# Patient Record
Sex: FEMALE | Race: BLACK OR AFRICAN AMERICAN | NOT HISPANIC OR LATINO | Employment: OTHER | URBAN - METROPOLITAN AREA
[De-identification: names, ages, dates, MRNs, and addresses within clinical notes are randomized per-mention and may not be internally consistent; named-entity substitution may affect disease eponyms.]

---

## 2017-01-01 ENCOUNTER — OFFICE VISIT (OUTPATIENT)
Dept: HEMATOLOGY/ONCOLOGY | Facility: CLINIC | Age: 77
End: 2017-01-01
Payer: COMMERCIAL

## 2017-01-01 ENCOUNTER — TELEPHONE (OUTPATIENT)
Dept: UROLOGY | Facility: CLINIC | Age: 77
End: 2017-01-01

## 2017-01-01 ENCOUNTER — HOSPITAL ENCOUNTER (INPATIENT)
Facility: HOSPITAL | Age: 77
LOS: 2 days | DRG: 871 | End: 2017-12-29
Attending: EMERGENCY MEDICINE | Admitting: HOSPITALIST
Payer: COMMERCIAL

## 2017-01-01 ENCOUNTER — OFFICE VISIT (OUTPATIENT)
Dept: PULMONOLOGY | Facility: CLINIC | Age: 77
End: 2017-01-01
Payer: COMMERCIAL

## 2017-01-01 ENCOUNTER — HOSPITAL ENCOUNTER (OUTPATIENT)
Dept: RADIOLOGY | Facility: HOSPITAL | Age: 77
Discharge: HOME | End: 2017-05-09
Attending: PHYSICAL MEDICINE & REHABILITATION
Payer: COMMERCIAL

## 2017-01-01 VITALS
BODY MASS INDEX: 23.14 KG/M2 | OXYGEN SATURATION: 98 % | TEMPERATURE: 98 F | RESPIRATION RATE: 19 BRPM | HEIGHT: 58 IN | HEART RATE: 118 BPM | SYSTOLIC BLOOD PRESSURE: 90 MMHG | DIASTOLIC BLOOD PRESSURE: 64 MMHG | WEIGHT: 110.25 LBS

## 2017-01-01 VITALS
DIASTOLIC BLOOD PRESSURE: 90 MMHG | SYSTOLIC BLOOD PRESSURE: 148 MMHG | DIASTOLIC BLOOD PRESSURE: 97 MMHG | HEIGHT: 58 IN | HEIGHT: 59 IN | RESPIRATION RATE: 18 BRPM | HEART RATE: 111 BPM | BODY MASS INDEX: 22.88 KG/M2 | WEIGHT: 110.81 LBS | HEART RATE: 111 BPM | BODY MASS INDEX: 22.34 KG/M2 | TEMPERATURE: 98 F | WEIGHT: 109 LBS | SYSTOLIC BLOOD PRESSURE: 140 MMHG

## 2017-01-01 DIAGNOSIS — J96.21 ACUTE ON CHRONIC RESPIRATORY FAILURE WITH HYPOXIA AND HYPERCAPNIA: ICD-10-CM

## 2017-01-01 DIAGNOSIS — J43.9 PULMONARY EMPHYSEMA, UNSPECIFIED EMPHYSEMA TYPE: Primary | Chronic | ICD-10-CM

## 2017-01-01 DIAGNOSIS — J96.22 ACUTE ON CHRONIC RESPIRATORY FAILURE WITH HYPOXIA AND HYPERCAPNIA: ICD-10-CM

## 2017-01-01 DIAGNOSIS — I27.20 PULMONARY HYPERTENSION: ICD-10-CM

## 2017-01-01 DIAGNOSIS — D75.1 POLYCYTHEMIA SECONDARY TO HYPOXIA: ICD-10-CM

## 2017-01-01 DIAGNOSIS — I26.99 PULMONARY EMBOLISM ON RIGHT: Primary | Chronic | ICD-10-CM

## 2017-01-01 DIAGNOSIS — N28.89 RIGHT RENAL MASS: Chronic | ICD-10-CM

## 2017-01-01 DIAGNOSIS — J96.21 ACUTE ON CHRONIC RESPIRATORY FAILURE WITH HYPOXIA: Primary | ICD-10-CM

## 2017-01-01 DIAGNOSIS — R09.02 HYPOXEMIA: ICD-10-CM

## 2017-01-01 DIAGNOSIS — I26.99 PULMONARY EMBOLISM ON RIGHT: Chronic | ICD-10-CM

## 2017-01-01 DIAGNOSIS — J44.1 COPD WITH ACUTE EXACERBATION: ICD-10-CM

## 2017-01-01 DIAGNOSIS — D69.6 THROMBOCYTOPENIA: Chronic | ICD-10-CM

## 2017-01-01 DIAGNOSIS — R09.02 HYPOXIA: ICD-10-CM

## 2017-01-01 DIAGNOSIS — R06.02 SHORTNESS OF BREATH: ICD-10-CM

## 2017-01-01 LAB
ALBUMIN SERPL BCP-MCNC: 2.7 G/DL
ALP SERPL-CCNC: 64 U/L
ALT SERPL W/O P-5'-P-CCNC: 11 U/L
ANION GAP SERPL CALC-SCNC: 15 MMOL/L
AST SERPL-CCNC: 24 U/L
BACTERIA #/AREA URNS HPF: NORMAL /HPF
BACTERIA UR CULT: NORMAL
BASOPHILS NFR BLD: 0 %
BILIRUB SERPL-MCNC: 0.6 MG/DL
BILIRUB UR QL STRIP: NEGATIVE
BNP SERPL-MCNC: 1708 PG/ML
BUN SERPL-MCNC: 12 MG/DL
CALCIUM SERPL-MCNC: 8.3 MG/DL
CHLORIDE SERPL-SCNC: 105 MMOL/L
CLARITY UR: CLEAR
CO2 SERPL-SCNC: 21 MMOL/L
COLOR UR: YELLOW
CREAT SERPL-MCNC: 0.8 MG/DL
CRP SERPL-MCNC: 25.2 MG/L
DIFFERENTIAL METHOD: ABNORMAL
EOSINOPHIL NFR BLD: 0 %
ERYTHROCYTE [DISTWIDTH] IN BLOOD BY AUTOMATED COUNT: 15 %
EST. GFR  (AFRICAN AMERICAN): >60 ML/MIN/1.73 M^2
EST. GFR  (NON AFRICAN AMERICAN): >60 ML/MIN/1.73 M^2
ESTIMATED AVG GLUCOSE: 111 MG/DL
GLUCOSE SERPL-MCNC: 213 MG/DL
GLUCOSE UR QL STRIP: NEGATIVE
HBA1C MFR BLD HPLC: 5.5 %
HCT VFR BLD AUTO: 44.9 %
HGB BLD-MCNC: 14.4 G/DL
HGB UR QL STRIP: NEGATIVE
HYALINE CASTS #/AREA URNS LPF: 0 /LPF
KETONES UR QL STRIP: NEGATIVE
LACTATE SERPL-SCNC: 2.7 MMOL/L
LACTATE SERPL-SCNC: 4.8 MMOL/L
LACTATE SERPL-SCNC: 5.1 MMOL/L
LACTATE SERPL-SCNC: 6.5 MMOL/L
LEUKOCYTE ESTERASE UR QL STRIP: NEGATIVE
LYMPHOCYTES NFR BLD: 4 %
MAGNESIUM SERPL-MCNC: 1.6 MG/DL
MCH RBC QN AUTO: 33.9 PG
MCHC RBC AUTO-ENTMCNC: 32 G/DL
MCV RBC AUTO: 106 FL
MICROSCOPIC COMMENT: NORMAL
MONOCYTES NFR BLD: 0 %
NEUTROPHILS NFR BLD: 95 %
NEUTS BAND NFR BLD MANUAL: 1 %
NITRITE UR QL STRIP: NEGATIVE
PH UR STRIP: 6 [PH] (ref 5–8)
PHOSPHATE SERPL-MCNC: 3.6 MG/DL
PLATELET # BLD AUTO: 119 K/UL
PLATELET BLD QL SMEAR: ABNORMAL
PMV BLD AUTO: 9.9 FL
POTASSIUM SERPL-SCNC: 4 MMOL/L
PROCALCITONIN SERPL IA-MCNC: 0.14 NG/ML
PROT SERPL-MCNC: 7.3 G/DL
PROT UR QL STRIP: ABNORMAL
RBC # BLD AUTO: 4.24 M/UL
RBC #/AREA URNS HPF: 0 /HPF (ref 0–4)
SODIUM SERPL-SCNC: 141 MMOL/L
SP GR UR STRIP: 1.01 (ref 1–1.03)
SQUAMOUS #/AREA URNS HPF: 3 /HPF
TROPONIN I SERPL DL<=0.01 NG/ML-MCNC: 0.03 NG/ML
URN SPEC COLLECT METH UR: ABNORMAL
UROBILINOGEN UR STRIP-ACNC: ABNORMAL EU/DL
WBC # BLD AUTO: 4.4 K/UL
WBC #/AREA URNS HPF: 0 /HPF (ref 0–5)

## 2017-01-01 PROCEDURE — 25000003 PHARM REV CODE 250

## 2017-01-01 PROCEDURE — 84484 ASSAY OF TROPONIN QUANT: CPT | Mod: 91

## 2017-01-01 PROCEDURE — 83605 ASSAY OF LACTIC ACID: CPT

## 2017-01-01 PROCEDURE — 85027 COMPLETE CBC AUTOMATED: CPT

## 2017-01-01 PROCEDURE — 99214 OFFICE O/P EST MOD 30 MIN: CPT | Mod: ,,, | Performed by: INTERNAL MEDICINE

## 2017-01-01 PROCEDURE — 63600175 PHARM REV CODE 636 W HCPCS: Performed by: NURSE PRACTITIONER

## 2017-01-01 PROCEDURE — 87086 URINE CULTURE/COLONY COUNT: CPT

## 2017-01-01 PROCEDURE — 27000190 HC CPAP FULL FACE MASK W/VALVE

## 2017-01-01 PROCEDURE — 84100 ASSAY OF PHOSPHORUS: CPT

## 2017-01-01 PROCEDURE — 25000242 PHARM REV CODE 250 ALT 637 W/ HCPCS: Performed by: EMERGENCY MEDICINE

## 2017-01-01 PROCEDURE — 99285 EMERGENCY DEPT VISIT HI MDM: CPT | Mod: 25

## 2017-01-01 PROCEDURE — 3077F SYST BP >= 140 MM HG: CPT | Mod: ,,, | Performed by: INTERNAL MEDICINE

## 2017-01-01 PROCEDURE — 1126F AMNT PAIN NOTED NONE PRSNT: CPT | Mod: ,,, | Performed by: INTERNAL MEDICINE

## 2017-01-01 PROCEDURE — 80053 COMPREHEN METABOLIC PANEL: CPT

## 2017-01-01 PROCEDURE — 25000242 PHARM REV CODE 250 ALT 637 W/ HCPCS: Performed by: HOSPITALIST

## 2017-01-01 PROCEDURE — 1159F MED LIST DOCD IN RCRD: CPT | Mod: ,,, | Performed by: INTERNAL MEDICINE

## 2017-01-01 PROCEDURE — 3008F BODY MASS INDEX DOCD: CPT | Mod: ,,, | Performed by: INTERNAL MEDICINE

## 2017-01-01 PROCEDURE — 83735 ASSAY OF MAGNESIUM: CPT

## 2017-01-01 PROCEDURE — 25000003 PHARM REV CODE 250: Performed by: HOSPITALIST

## 2017-01-01 PROCEDURE — 3080F DIAST BP >= 90 MM HG: CPT | Mod: ,,, | Performed by: INTERNAL MEDICINE

## 2017-01-01 PROCEDURE — 63600175 PHARM REV CODE 636 W HCPCS: Performed by: HOSPITALIST

## 2017-01-01 PROCEDURE — 12000002 HC ACUTE/MED SURGE SEMI-PRIVATE ROOM

## 2017-01-01 PROCEDURE — 81000 URINALYSIS NONAUTO W/SCOPE: CPT

## 2017-01-01 PROCEDURE — 99900035 HC TECH TIME PER 15 MIN (STAT)

## 2017-01-01 PROCEDURE — 83605 ASSAY OF LACTIC ACID: CPT | Mod: 91

## 2017-01-01 PROCEDURE — 83880 ASSAY OF NATRIURETIC PEPTIDE: CPT

## 2017-01-01 PROCEDURE — 93005 ELECTROCARDIOGRAM TRACING: CPT

## 2017-01-01 PROCEDURE — 71275 CT ANGIOGRAPHY CHEST: CPT | Mod: 26,,, | Performed by: RADIOLOGY

## 2017-01-01 PROCEDURE — 94660 CPAP INITIATION&MGMT: CPT

## 2017-01-01 PROCEDURE — 25000003 PHARM REV CODE 250: Performed by: NURSE PRACTITIONER

## 2017-01-01 PROCEDURE — 96365 THER/PROPH/DIAG IV INF INIT: CPT

## 2017-01-01 PROCEDURE — 83036 HEMOGLOBIN GLYCOSYLATED A1C: CPT

## 2017-01-01 PROCEDURE — 36415 COLL VENOUS BLD VENIPUNCTURE: CPT

## 2017-01-01 PROCEDURE — 25000003 PHARM REV CODE 250: Performed by: EMERGENCY MEDICINE

## 2017-01-01 PROCEDURE — 85007 BL SMEAR W/DIFF WBC COUNT: CPT

## 2017-01-01 PROCEDURE — 25500020 PHARM REV CODE 255

## 2017-01-01 PROCEDURE — 86140 C-REACTIVE PROTEIN: CPT

## 2017-01-01 PROCEDURE — 94640 AIRWAY INHALATION TREATMENT: CPT

## 2017-01-01 PROCEDURE — 84145 PROCALCITONIN (PCT): CPT

## 2017-01-01 PROCEDURE — 99223 1ST HOSP IP/OBS HIGH 75: CPT | Mod: ,,, | Performed by: INTERNAL MEDICINE

## 2017-01-01 PROCEDURE — 94761 N-INVAS EAR/PLS OXIMETRY MLT: CPT

## 2017-01-01 PROCEDURE — 20000000 HC ICU ROOM

## 2017-01-01 PROCEDURE — 63600175 PHARM REV CODE 636 W HCPCS: Performed by: EMERGENCY MEDICINE

## 2017-01-01 PROCEDURE — 71275 CT ANGIOGRAPHY CHEST: CPT | Mod: TC

## 2017-01-01 RX ORDER — POTASSIUM CHLORIDE 20 MEQ/15ML
40 SOLUTION ORAL
Status: DISCONTINUED | OUTPATIENT
Start: 2017-01-01 | End: 2017-01-01 | Stop reason: HOSPADM

## 2017-01-01 RX ORDER — SUCRALFATE 1 G/10ML
1 SUSPENSION ORAL
Status: DISCONTINUED | OUTPATIENT
Start: 2017-01-01 | End: 2017-01-01 | Stop reason: HOSPADM

## 2017-01-01 RX ORDER — BUSPIRONE HYDROCHLORIDE 5 MG/1
5 TABLET ORAL 2 TIMES DAILY
Status: DISCONTINUED | OUTPATIENT
Start: 2017-01-01 | End: 2017-01-01 | Stop reason: HOSPADM

## 2017-01-01 RX ORDER — CYANOCOBALAMIN (VITAMIN B-12) 250 MCG
500 TABLET ORAL DAILY
Status: DISCONTINUED | OUTPATIENT
Start: 2017-01-01 | End: 2017-01-01 | Stop reason: HOSPADM

## 2017-01-01 RX ORDER — SIMETHICONE 80 MG
TABLET,CHEWABLE ORAL
Status: COMPLETED
Start: 2017-01-01 | End: 2017-01-01

## 2017-01-01 RX ORDER — FUROSEMIDE 20 MG/1
TABLET ORAL
Qty: 30 TABLET | Refills: 6 | Status: SHIPPED | OUTPATIENT
Start: 2017-01-01 | End: 2017-01-01 | Stop reason: SDUPTHER

## 2017-01-01 RX ORDER — POTASSIUM CHLORIDE 20 MEQ/1
40 TABLET, EXTENDED RELEASE ORAL ONCE
Status: COMPLETED | OUTPATIENT
Start: 2017-01-01 | End: 2017-01-01

## 2017-01-01 RX ORDER — GLUCAGON 1 MG
1 KIT INJECTION
Status: DISCONTINUED | OUTPATIENT
Start: 2017-01-01 | End: 2017-01-01 | Stop reason: HOSPADM

## 2017-01-01 RX ORDER — ALBUTEROL SULFATE 2.5 MG/.5ML
10 SOLUTION RESPIRATORY (INHALATION)
Status: COMPLETED | OUTPATIENT
Start: 2017-01-01 | End: 2017-01-01

## 2017-01-01 RX ORDER — IPRATROPIUM BROMIDE AND ALBUTEROL SULFATE 2.5; .5 MG/3ML; MG/3ML
3 SOLUTION RESPIRATORY (INHALATION) EVERY 4 HOURS
Status: DISCONTINUED | OUTPATIENT
Start: 2017-01-01 | End: 2017-01-01 | Stop reason: HOSPADM

## 2017-01-01 RX ORDER — SODIUM CHLORIDE 0.9 % (FLUSH) 0.9 %
5 SYRINGE (ML) INJECTION
Status: DISCONTINUED | OUTPATIENT
Start: 2017-01-01 | End: 2017-01-01 | Stop reason: HOSPADM

## 2017-01-01 RX ORDER — IBUPROFEN 200 MG
24 TABLET ORAL
Status: DISCONTINUED | OUTPATIENT
Start: 2017-01-01 | End: 2017-01-01 | Stop reason: HOSPADM

## 2017-01-01 RX ORDER — LANOLIN ALCOHOL/MO/W.PET/CERES
800 CREAM (GRAM) TOPICAL
Status: DISCONTINUED | OUTPATIENT
Start: 2017-01-01 | End: 2017-01-01 | Stop reason: HOSPADM

## 2017-01-01 RX ORDER — SIMETHICONE 80 MG
1 TABLET,CHEWABLE ORAL 3 TIMES DAILY PRN
Status: DISCONTINUED | OUTPATIENT
Start: 2017-01-01 | End: 2017-01-01 | Stop reason: HOSPADM

## 2017-01-01 RX ORDER — ASPIRIN 81 MG/1
81 TABLET ORAL DAILY
Status: DISCONTINUED | OUTPATIENT
Start: 2017-01-01 | End: 2017-01-01 | Stop reason: HOSPADM

## 2017-01-01 RX ORDER — AMOXICILLIN 250 MG
1 CAPSULE ORAL 2 TIMES DAILY
Status: DISCONTINUED | OUTPATIENT
Start: 2017-01-01 | End: 2017-01-01 | Stop reason: HOSPADM

## 2017-01-01 RX ORDER — IBUPROFEN 200 MG
16 TABLET ORAL
Status: DISCONTINUED | OUTPATIENT
Start: 2017-01-01 | End: 2017-01-01 | Stop reason: HOSPADM

## 2017-01-01 RX ORDER — POTASSIUM CHLORIDE 20 MEQ/15ML
60 SOLUTION ORAL
Status: DISCONTINUED | OUTPATIENT
Start: 2017-01-01 | End: 2017-01-01 | Stop reason: HOSPADM

## 2017-01-01 RX ORDER — ONDANSETRON 2 MG/ML
8 INJECTION INTRAMUSCULAR; INTRAVENOUS EVERY 8 HOURS PRN
Status: DISCONTINUED | OUTPATIENT
Start: 2017-01-01 | End: 2017-01-01 | Stop reason: HOSPADM

## 2017-01-01 RX ORDER — SODIUM CHLORIDE 9 MG/ML
INJECTION, SOLUTION INTRAVENOUS ONCE
Status: COMPLETED | OUTPATIENT
Start: 2017-01-01 | End: 2017-01-01

## 2017-01-01 RX ORDER — AMLODIPINE BESYLATE 5 MG/1
5 TABLET ORAL DAILY
Status: DISCONTINUED | OUTPATIENT
Start: 2017-01-01 | End: 2017-01-01

## 2017-01-01 RX ORDER — RAMELTEON 8 MG/1
8 TABLET ORAL NIGHTLY PRN
Status: DISCONTINUED | OUTPATIENT
Start: 2017-01-01 | End: 2017-01-01 | Stop reason: SDUPTHER

## 2017-01-01 RX ORDER — MEGESTROL ACETATE 40 MG/1
40 TABLET ORAL DAILY
Status: DISCONTINUED | OUTPATIENT
Start: 2017-01-01 | End: 2017-01-01 | Stop reason: HOSPADM

## 2017-01-01 RX ORDER — ACETAMINOPHEN 500 MG
1000 TABLET ORAL EVERY 6 HOURS PRN
Status: DISCONTINUED | OUTPATIENT
Start: 2017-01-01 | End: 2017-01-01 | Stop reason: HOSPADM

## 2017-01-01 RX ORDER — METHYLPREDNISOLONE SOD SUCC 125 MG
80 VIAL (EA) INJECTION EVERY 8 HOURS
Status: DISCONTINUED | OUTPATIENT
Start: 2017-01-01 | End: 2017-01-01 | Stop reason: HOSPADM

## 2017-01-01 RX ORDER — ENOXAPARIN SODIUM 100 MG/ML
40 INJECTION SUBCUTANEOUS EVERY 24 HOURS
Status: DISCONTINUED | OUTPATIENT
Start: 2017-01-01 | End: 2017-01-01 | Stop reason: HOSPADM

## 2017-01-01 RX ORDER — IPRATROPIUM BROMIDE AND ALBUTEROL SULFATE 2.5; .5 MG/3ML; MG/3ML
3 SOLUTION RESPIRATORY (INHALATION) EVERY 4 HOURS
Status: DISCONTINUED | OUTPATIENT
Start: 2017-01-01 | End: 2017-01-01

## 2017-01-01 RX ORDER — RAMELTEON 8 MG/1
8 TABLET ORAL NIGHTLY PRN
Status: DISCONTINUED | OUTPATIENT
Start: 2017-01-01 | End: 2017-01-01 | Stop reason: HOSPADM

## 2017-01-01 RX ORDER — SODIUM CHLORIDE 9 MG/ML
INJECTION, SOLUTION INTRAVENOUS CONTINUOUS
Status: DISCONTINUED | OUTPATIENT
Start: 2017-01-01 | End: 2017-01-01 | Stop reason: HOSPADM

## 2017-01-01 RX ADMIN — SUCRALFATE 1 G: 1 SUSPENSION ORAL at 10:12

## 2017-01-01 RX ADMIN — BUSPIRONE HYDROCHLORIDE 5 MG: 5 TABLET ORAL at 08:12

## 2017-01-01 RX ADMIN — BUSPIRONE HYDROCHLORIDE 5 MG: 5 TABLET ORAL at 10:12

## 2017-01-01 RX ADMIN — MEGESTROL ACETATE 40 MG: 40 TABLET ORAL at 08:12

## 2017-01-01 RX ADMIN — SODIUM CHLORIDE 1500 ML: 0.9 INJECTION, SOLUTION INTRAVENOUS at 01:12

## 2017-01-01 RX ADMIN — METHYLPREDNISOLONE SODIUM SUCCINATE 80 MG: 125 INJECTION, POWDER, FOR SOLUTION INTRAMUSCULAR; INTRAVENOUS at 05:12

## 2017-01-01 RX ADMIN — SIMETHICONE CHEW TAB 80 MG 80 MG: 80 TABLET ORAL at 10:12

## 2017-01-01 RX ADMIN — Medication 80 MG: at 10:12

## 2017-01-01 RX ADMIN — IPRATROPIUM BROMIDE AND ALBUTEROL SULFATE 3 ML: .5; 3 SOLUTION RESPIRATORY (INHALATION) at 03:12

## 2017-01-01 RX ADMIN — SODIUM CHLORIDE 500 ML: 0.9 INJECTION, SOLUTION INTRAVENOUS at 11:12

## 2017-01-01 RX ADMIN — IPRATROPIUM BROMIDE AND ALBUTEROL SULFATE 3 ML: .5; 3 SOLUTION RESPIRATORY (INHALATION) at 11:12

## 2017-01-01 RX ADMIN — MAGNESIUM OXIDE TAB 400 MG (241.3 MG ELEMENTAL MG) 800 MG: 400 (241.3 MG) TAB at 10:12

## 2017-01-01 RX ADMIN — METHYLPREDNISOLONE SODIUM SUCCINATE 80 MG: 125 INJECTION, POWDER, FOR SOLUTION INTRAMUSCULAR; INTRAVENOUS at 02:12

## 2017-01-01 RX ADMIN — AZITHROMYCIN MONOHYDRATE 500 MG: 500 INJECTION, POWDER, LYOPHILIZED, FOR SOLUTION INTRAVENOUS at 11:12

## 2017-01-01 RX ADMIN — MAGNESIUM OXIDE TAB 400 MG (241.3 MG ELEMENTAL MG) 800 MG: 400 (241.3 MG) TAB at 03:12

## 2017-01-01 RX ADMIN — CYANOCOBALAMIN TAB 250 MCG 500 MCG: 250 TAB at 08:12

## 2017-01-01 RX ADMIN — SODIUM CHLORIDE: 0.9 INJECTION, SOLUTION INTRAVENOUS at 08:12

## 2017-01-01 RX ADMIN — LIDOCAINE HYDROCHLORIDE: 20 SOLUTION ORAL; TOPICAL at 03:12

## 2017-01-01 RX ADMIN — PIPERACILLIN SODIUM AND TAZOBACTAM SODIUM 4.5 G: 4; .5 INJECTION, POWDER, LYOPHILIZED, FOR SOLUTION INTRAVENOUS at 02:12

## 2017-01-01 RX ADMIN — PIPERACILLIN SODIUM AND TAZOBACTAM SODIUM 4.5 G: 4; .5 INJECTION, POWDER, FOR SOLUTION INTRAVENOUS at 06:12

## 2017-01-01 RX ADMIN — STANDARDIZED SENNA CONCENTRATE AND DOCUSATE SODIUM 1 TABLET: 8.6; 5 TABLET, FILM COATED ORAL at 10:12

## 2017-01-01 RX ADMIN — VANCOMYCIN HYDROCHLORIDE 750 MG: 1 INJECTION, POWDER, LYOPHILIZED, FOR SOLUTION INTRAVENOUS at 02:12

## 2017-01-01 RX ADMIN — IPRATROPIUM BROMIDE AND ALBUTEROL SULFATE 3 ML: .5; 3 SOLUTION RESPIRATORY (INHALATION) at 04:12

## 2017-01-01 RX ADMIN — POTASSIUM CHLORIDE 40 MEQ: 1500 TABLET, EXTENDED RELEASE ORAL at 02:12

## 2017-01-01 RX ADMIN — ALBUTEROL SULFATE 10 MG: 2.5 SOLUTION RESPIRATORY (INHALATION) at 11:12

## 2017-01-01 RX ADMIN — SODIUM CHLORIDE: 0.9 INJECTION, SOLUTION INTRAVENOUS at 12:12

## 2017-01-01 RX ADMIN — PIPERACILLIN SODIUM AND TAZOBACTAM SODIUM 4.5 G: 4; .5 INJECTION, POWDER, FOR SOLUTION INTRAVENOUS at 10:12

## 2017-01-01 RX ADMIN — IOHEXOL 100 ML: 350 INJECTION, SOLUTION INTRAVENOUS at 10:05

## 2017-01-01 RX ADMIN — SUCRALFATE 1 G: 1 SUSPENSION ORAL at 05:12

## 2017-01-01 RX ADMIN — ASPIRIN 81 MG: 81 TABLET, COATED ORAL at 08:12

## 2017-01-01 RX ADMIN — ENOXAPARIN SODIUM 40 MG: 100 INJECTION SUBCUTANEOUS at 05:12

## 2017-01-01 RX ADMIN — SODIUM CHLORIDE: 0.9 INJECTION, SOLUTION INTRAVENOUS at 11:12

## 2017-01-01 RX ADMIN — STANDARDIZED SENNA CONCENTRATE AND DOCUSATE SODIUM 1 TABLET: 8.6; 5 TABLET, FILM COATED ORAL at 08:12

## 2017-01-01 RX ADMIN — METHYLPREDNISOLONE SODIUM SUCCINATE 80 MG: 125 INJECTION, POWDER, FOR SOLUTION INTRAMUSCULAR; INTRAVENOUS at 10:12

## 2017-01-01 RX ADMIN — ACETAMINOPHEN 1000 MG: 500 TABLET ORAL at 12:12

## 2017-01-01 RX ADMIN — IPRATROPIUM BROMIDE AND ALBUTEROL SULFATE 3 ML: .5; 3 SOLUTION RESPIRATORY (INHALATION) at 12:12

## 2017-01-01 RX ADMIN — IPRATROPIUM BROMIDE AND ALBUTEROL SULFATE 3 ML: .5; 3 SOLUTION RESPIRATORY (INHALATION) at 07:12

## 2017-01-01 RX ADMIN — ONDANSETRON 8 MG: 2 INJECTION INTRAMUSCULAR; INTRAVENOUS at 02:12

## 2017-05-15 PROBLEM — N28.89 RIGHT RENAL MASS: Chronic | Status: ACTIVE | Noted: 2017-01-01

## 2017-06-19 NOTE — TELEPHONE ENCOUNTER
Attempted to contact patient in regards to rescheduling appt. Insurance [employer] is not accepted

## 2017-08-08 PROBLEM — D75.1 POLYCYTHEMIA SECONDARY TO HYPOXIA: Status: ACTIVE | Noted: 2017-01-01

## 2017-08-08 NOTE — PROGRESS NOTES
[facility] Hematology/Oncology  PROGRESS NOTE      Subjective:       Patient ID:   NAME: Fatuma Velez : 1940     76 y.o. female    Referring Doc: Viridiana  Other Physicians: Bandar    Chief Complaint:  Polycythemia/chronic tcp    History of Present Illness:     Patient returns today for a regularly scheduled follow-up visit.  The patient is here with her daughter. She is on O2 portable. She denies any new issues. Her breathing has been stable. She denies any CP, HA's or N/V. No hematuria.             ROS:   GEN: normal without any fever, night sweats or weight loss  HEENT: normal with no HA's, sore throat, stiff neck, changes in vision  CV: normal with no CP, SOB, PND, THOMSON or orthopnea  PULM: normal with no SOB, cough, hemoptysis, sputum or pleuritic pain  GI: normal with no abdominal pain, nausea, vomiting, constipation, diarrhea, melanotic stools, BRBPR, or hematemesis  : normal with no hematuria, dysuria  BREAST: normal with no mass, discharge, pain  SKIN: normal with no rash, erythema, bruising, or swelling    Allergies:  Review of patient's allergies indicates:  No Known Allergies    Medications:    Current Outpatient Prescriptions:     albuterol (VENTOLIN HFA) 90 mcg/actuation inhaler, Inhale 2 puffs into the lungs every 6 (six) hours as needed for Wheezing. Rescue, Disp: 18 g, Rfl: 0    amlodipine (NORVASC) 5 MG tablet, Take 1 tablet (5 mg total) by mouth once daily., Disp: 90 tablet, Rfl: 0    aspirin (ECOTRIN) 81 MG EC tablet, Take 81 mg by mouth once daily., Disp: , Rfl:     busPIRone (BUSPAR) 5 MG Tab, Take 1 tablet (5 mg total) by mouth 2 (two) times daily as needed., Disp: 45 tablet, Rfl: 1    cyanocobalamin (VITAMIN B-12) 500 MCG tablet, Take 500 mcg by mouth once daily., Disp: , Rfl:     fluticasone-salmeterol 250-50 mcg/dose (ADVAIR DISKUS) 250-50 mcg/dose diskus inhaler, Inhale 1 puff into the lungs 2 (two) times daily., Disp: 60 each, Rfl: 1    furosemide (LASIX) 20 MG tablet, TAKE 1  "TABLET BY MOUTH EVERY DAY, Disp: 30 tablet, Rfl: 6    lisinopril 10 MG tablet, Take 1 tablet (10 mg total) by mouth once daily., Disp: 90 tablet, Rfl: 0    megestrol (MEGACE) 40 MG Tab, Take 1 tablet (40 mg total) by mouth once daily., Disp: 90 tablet, Rfl: 0    potassium chloride (MICRO-K) 10 MEQ CpSR, Take 1 capsule (10 mEq total) by mouth once daily., Disp: 90 capsule, Rfl: 0    VENTOLIN HFA 90 mcg/actuation inhaler, TAKE 2 PUFFS BY MOUTH EVERY 6 HOURS AS NEEDED, Disp: 18 g, Rfl: 0    PMHx/PSHx Updates:  See patient's last visit with me on 2/1/2017.  See H&P on 5/19/16        Pathology:  No matching staging information was found for the patient.          Objective:     Vitals:  Blood pressure (!) 148/97, pulse (!) 111, temperature 97.9 °F (36.6 °C), resp. rate 18, height 4' 11" (1.499 m), weight 50.3 kg (110 lb 12.8 oz).    Physical Examination:   GEN: no apparent distress, comfortable; AAOx3  HEAD: atraumatic and normocephalic  EYES: no pallor, no icterus, PERRLA  ENT: OMM, no pharyngeal erythema, external ears WNL; no nasal discharge; no thrush  NECK: no masses, thyroid normal, trachea midline, no LAD/LN's, supple  CV: RRR with no murmur; normal pulse; normal S1 and S2; mild pedal edema  CHEST: diffuse tight BS bilat; CTAB; normal breath sounds; no wheeze or crackles; on O2 portable  ABDOM: nontender and nondistended; soft; normal bowel sounds; no rebound/guarding  MUSC/Skeletal: ROM normal; no crepitus; joints normal; no deformities or arthropathy  EXTREM: no clubbing, cyanosis, inflammation or swelling  SKIN: no rashes, lesions, ulcers, petechiae or subcutaneous nodules  : no lopez  NEURO: grossly intact; motor/sensory WNL; AAOx3; no tremors  PSYCH: normal mood, affect and behavior  LYMPH: normal cervical, supraclavicular, axillary and groin LN's            Labs:     2/1/2017      Radiology/Diagnostic Studies:    CTA 5/9/2017    I have reviewed all available lab results and radiology " reports.    Assessment/Plan:   (1) 76 y.o. female with diagnosis of prior mild borderline polycythemia and chronic thrombocytopenia  - prior antiplatelet antibodies were negative  - prior JAK2 was negative  - suspect the polycythemia is due to the underlying COPD issues  - prior repeat platelet studies were wnl  - last CBC was from feb 2017    (2) Recent CTA with chronic right-sided pulmonary emboli in May 2017; she is not on any blood thinners    (3) COPD/empysema - followed by Dr Portillo with pulmonary  - former smoker - quit last year    (3) right kidney nodule - patient was referred to urology by Dr Kemp but she has yet to go to one    (4) HTN - followed by Dr Kemp    (5) poor historian        PLAN:  1. Recommend and encourage her to go see an urologist  2. Refer to   3. Check up to date CBC/plat  4. F/u with PCP and Pulm  RTC in 4 weeks  Fax note to  Henna Kemp MD, and Bandar    I spent over 25 mins with the patient, of which over half was face to face with the patient. Reviewing materials, labs, reports and studies. Making treatment and analytical decisions. Ordering necessary labs, tests and studies.      Discussion:     I have explained all of the above in detail and the patient understands all of the current recommendation(s). I have answered all of their questions to the best of my ability and to their complete satisfaction.   The patient is to continue with the current management plan.            Electronically signed by Adolfo Martin MD

## 2017-08-08 NOTE — LETTER
August 8, 2017      Henna Kemp MD  [address] [place] [address]           Critical access hospital Hematology Oncology  [address] [facility] [address] [facility]  [address]  [facility] [address]  Phone: [phone]  Fax: [phone]          Patient: Fatuma Velez   MR Number: [id number]   YOB: 1940   Date of Visit: 8/8/2017       Dear Dr. Henna Kemp:    Thank you for referring Fatuma Velez to me for evaluation. Attached you will find relevant portions of my assessment and plan of care.    If you have questions, please do not hesitate to call me. I look forward to following Fatuma Velez along with you.    Sincerely,    Adolfo Martin MD    Enclosure  CC:  No Recipients    If you would like to receive this communication electronically, please contact [email or web address] or [phone] to request more information on [address] Link access.    For providers and/or their staff who would like to refer a patient to [facility], please contact us through our one-stop-shop provider referral line, [facility], at [phone].    If you feel you have received this communication in error or would no longer like to receive these types of communications, please e-mail [email or web address]

## 2017-08-25 PROBLEM — R09.02 HYPOXEMIA: Status: ACTIVE | Noted: 2017-01-01

## 2017-08-25 NOTE — PROGRESS NOTES
Office Visit    HPI:    Here for follow up, doing well, no new respiratory complaints.  Would like to look into getting a portable concentrator for her oygen.  No ew issues, no recent hospitalizations.  Is seeing Dr Mendez to evaluate a renal mass noted on prior studies.    COPD    Pt is currently stable on present medications with no recent increases in their symptoms or use of rescue medications.  I have reviewed the medical regimen and re-educated the pt on the role of rescue and controlling medications.  All questions answered.  Inhaler technique seems adequate.    COPD Flowsheet    GOLD B    Last PFT - 11/2016  FEV1- 55 % DLCO - 8 %     + LABA/LAMA     + prn ALBUTEROL    mMRC -  0 - SOB with strenuous exercise   - + 1 - SOB level ground, slight hill   -  2 - SOB walk slower or stop for breath level ground   -  3 - SOB at 100 yards or after few minutes   -  4 - SOB in house, dressing    Referral to PULMONARY REHABILITATION -  NO    Tested for alpha-1-antitrypsin - NO  Result - na    Home Oxygen    Face to face visit with pt regarding their home oxygen.  We have discussed the need for oxygen including continuous use, prn use or night time use (as appropriate for the pt).  We discussed the need for compliance with the therapy. We will do recertifications as necessary.     Medications      Current Outpatient Prescriptions:     albuterol (VENTOLIN HFA) 90 mcg/actuation inhaler, Inhale 2 puffs into the lungs every 6 (six) hours as needed for Wheezing. Rescue, Disp: 18 g, Rfl: 0    amlodipine (NORVASC) 5 MG tablet, TAKE 1 TABLET(5 MG) BY MOUTH EVERY DAY, Disp: 90 tablet, Rfl: 0    aspirin (ECOTRIN) 81 MG EC tablet, Take 81 mg by mouth once daily., Disp: , Rfl:     busPIRone (BUSPAR) 5 MG Tab, Take 1 tablet (5 mg total) by mouth 2 (two) times daily as needed., Disp: 45 tablet, Rfl: 1    cyanocobalamin (VITAMIN B-12) 500 MCG tablet, Take 500 mcg by mouth once daily., Disp: , Rfl:     fluticasone-salmeterol  250-50 mcg/dose (ADVAIR DISKUS) 250-50 mcg/dose diskus inhaler, Inhale 1 puff into the lungs 2 (two) times daily., Disp: 60 each, Rfl: 1    furosemide (LASIX) 20 MG tablet, TAKE 1 TABLET BY MOUTH EVERY DAY, Disp: 30 tablet, Rfl: 6    lisinopril 10 MG tablet, TAKE 1 TABLET(10 MG) BY MOUTH EVERY DAY, Disp: 90 tablet, Rfl: 0    megestrol (MEGACE) 40 MG Tab, Take 1 tablet (40 mg total) by mouth once daily., Disp: 90 tablet, Rfl: 0    megestrol (MEGACE) 40 MG Tab, TAKE 1 TABLET(40 MG) BY MOUTH EVERY DAY, Disp: 90 tablet, Rfl: 0    potassium chloride (MICRO-K) 10 MEQ CpSR, TAKE 1 CAPSULE(10 MEQ) BY MOUTH EVERY DAY, Disp: 90 capsule, Rfl: 0    umeclidinium-vilanterol (ANORO ELLIPTA) 62.5-25 mcg/actuation DsDv, Inhale into the lungs. Controller, Disp: , Rfl:     VENTOLIN HFA 90 mcg/actuation inhaler, TAKE 2 PUFFS BY MOUTH EVERY 6 HOURS AS NEEDED, Disp: 18 g, Rfl: 0    Allergies    Review of patient's allergies indicates:   Allergen Reactions    Aspirin        Social History    History   Smoking Status    Former Smoker    Packs/day: 0.25    Years: 15.00    Types: Cigarettes    Quit date: 8/8/2016   Smokeless Tobacco    Never Used       ETOH - 0 drinks per week.    Drug Use - 0    Occupation - retired nurse assistant    Family History    Family History   Problem Relation Age of Onset    Hypertension Mother     Cancer Mother      breast cancer     Heart disease Mother     Cancer Father      lung ca    Hypertension Sister     Hypertension Brother     Diabetes Brother     Hyperlipidemia Brother     Heart disease Brother     Cancer Brother      gastric        ROS  Review of Systems   Constitutional: Negative for chills, diaphoresis, fever, malaise/fatigue and weight loss.   HENT: Negative for congestion.    Eyes: Negative for pain.   Respiratory: Negative for cough, hemoptysis, sputum production, shortness of breath, wheezing and stridor.    Cardiovascular: Negative for chest pain, palpitations, orthopnea,  "claudication, leg swelling and PND.   Gastrointestinal: Negative for abdominal pain, constipation, diarrhea, heartburn, nausea and vomiting.   Genitourinary: Negative for dysuria, frequency and urgency.   Musculoskeletal: Negative for falls and myalgias.   Neurological: Negative for sensory change, focal weakness and weakness.   Psychiatric/Behavioral: Negative for depression. The patient is not nervous/anxious.        Physical Exam    Vitals:    08/25/17 1012   BP: (!) 140/90   Pulse: (!) 111   Weight: 49.4 kg (109 lb)   Height: 4' 10" (1.473 m)       Physical Exam   Constitutional: She is oriented to person, place, and time. She appears well-developed and well-nourished. No distress.   HENT:   Head: Normocephalic and atraumatic.   Right Ear: External ear normal.   Left Ear: External ear normal.   Nose: Nose normal.   Mouth/Throat: Oropharynx is clear and moist.   Eyes: Conjunctivae and EOM are normal. Pupils are equal, round, and reactive to light.   Neck: Normal range of motion. Neck supple. No JVD present. No tracheal deviation present. No thyromegaly present.   Cardiovascular: Normal rate, regular rhythm, normal heart sounds and intact distal pulses.  Exam reveals no gallop and no friction rub.    No murmur heard.  Pulmonary/Chest: Effort normal and breath sounds normal. No stridor. No respiratory distress. She has no wheezes. She has no rales. She exhibits no tenderness.   Decreased BS throughout   Abdominal: Soft. Bowel sounds are normal. She exhibits no distension. There is no tenderness.   Musculoskeletal: Normal range of motion. She exhibits no edema or tenderness.   Lymphadenopathy:     She has no cervical adenopathy.   Neurological: She is alert and oriented to person, place, and time. No cranial nerve deficit.   Skin: Skin is warm and dry. She is not diaphoretic.   Psychiatric: She has a normal mood and affect. Her behavior is normal.   Nursing note and vitals reviewed.      Lab    @RESUFAST " (WBC,HGB,HCT,PLT)@    Sodium   Date Value Ref Range Status   05/09/2017 141 136 - 145 mmol/L Final   05/09/2017 141 136 - 145 mmol/L Final     Potassium   Date Value Ref Range Status   05/09/2017 3.4 (L) 3.5 - 5.1 mmol/L Final   05/09/2017 3.4 (L) 3.5 - 5.1 mmol/L Final     Chloride   Date Value Ref Range Status   05/09/2017 106 95 - 110 mmol/L Final   05/09/2017 106 95 - 110 mmol/L Final     CO2   Date Value Ref Range Status   05/09/2017 26 23 - 29 mmol/L Final   05/09/2017 26 23 - 29 mmol/L Final     Glucose   Date Value Ref Range Status   05/09/2017 91 70 - 110 mg/dL Final   05/09/2017 91 70 - 110 mg/dL Final     BUN, Bld   Date Value Ref Range Status   05/09/2017 11 8 - 23 mg/dL Final   05/09/2017 11 8 - 23 mg/dL Final     Creatinine   Date Value Ref Range Status   05/09/2017 0.8 0.5 - 1.4 mg/dL Final   05/09/2017 0.8 0.5 - 1.4 mg/dL Final     Calcium   Date Value Ref Range Status   05/09/2017 9.5 8.7 - 10.5 mg/dL Final   05/09/2017 9.5 8.7 - 10.5 mg/dL Final     Total Protein   Date Value Ref Range Status   05/09/2017 7.7 6.0 - 8.4 g/dL Final     Albumin   Date Value Ref Range Status   05/09/2017 3.5 3.5 - 5.2 g/dL Final     Total Bilirubin   Date Value Ref Range Status   05/09/2017 0.9 0.1 - 1.0 mg/dL Final     Comment:     For infants and newborns, interpretation of results should be based  on gestational age, weight and in agreement with clinical  observations.  Premature Infant recommended reference ranges:  Up to 24 hours.............<8.0 mg/dL  Up to 48 hours............<12.0 mg/dL  3-5 days..................<15.0 mg/dL  6-29 days.................<15.0 mg/dL       Alkaline Phosphatase   Date Value Ref Range Status   05/09/2017 56 55 - 135 U/L Final     AST   Date Value Ref Range Status   05/09/2017 37 10 - 40 U/L Final     ALT   Date Value Ref Range Status   05/09/2017 12 10 - 44 U/L Final     Anion Gap   Date Value Ref Range Status   05/09/2017 9 8 - 16 mmol/L Final   05/09/2017 9 8 - 16 mmol/L Final          Xray        Impression/Plan    Problem List Items Addressed This Visit        Pulmonary    Pulmonary emphysema - Primary (Chronic)  - continue present medications  - ordered portable concentrator  - RTC 6 month    Relevant Orders    OXYGEN FOR HOME USE    Hypoxemia  - continue with O2       Hematology    Pulmonary embolism on right (Chronic)  - aware      Other Visit Diagnoses    None.

## 2017-12-27 PROBLEM — A41.9 SEVERE SEPSIS: Status: ACTIVE | Noted: 2017-01-01

## 2017-12-27 PROBLEM — J18.9 COMMUNITY ACQUIRED PNEUMONIA OF RIGHT LOWER LOBE OF LUNG: Status: ACTIVE | Noted: 2017-01-01

## 2017-12-27 PROBLEM — J44.9 PULMONARY HYPERTENSION DUE TO COPD: Status: ACTIVE | Noted: 2017-01-01

## 2017-12-27 PROBLEM — R91.8 LUNG MASS: Status: ACTIVE | Noted: 2017-01-01

## 2017-12-27 PROBLEM — J96.22 ACUTE ON CHRONIC RESPIRATORY FAILURE WITH HYPOXIA AND HYPERCAPNIA: Status: ACTIVE | Noted: 2017-01-01

## 2017-12-27 PROBLEM — J96.21 ACUTE ON CHRONIC RESPIRATORY FAILURE WITH HYPOXIA AND HYPERCAPNIA: Status: ACTIVE | Noted: 2017-01-01

## 2017-12-27 PROBLEM — R65.20 SEVERE SEPSIS: Status: ACTIVE | Noted: 2017-01-01

## 2017-12-27 PROBLEM — E44.0 MALNUTRITION OF MODERATE DEGREE: Status: ACTIVE | Noted: 2017-01-01

## 2017-12-27 PROBLEM — I27.23 PULMONARY HYPERTENSION DUE TO COPD: Status: ACTIVE | Noted: 2017-01-01

## 2017-12-28 PROBLEM — R73.9 HYPERGLYCEMIA, DRUG-INDUCED: Status: ACTIVE | Noted: 2017-01-01

## 2017-12-28 PROBLEM — I24.89 DEMAND ISCHEMIA OF MYOCARDIUM: Status: ACTIVE | Noted: 2017-01-01

## 2017-12-28 PROBLEM — T50.905A HYPERGLYCEMIA, DRUG-INDUCED: Status: ACTIVE | Noted: 2017-01-01

## 2017-12-28 NOTE — ASSESSMENT & PLAN NOTE
Troponin rising (mildly elevated on arrival here) likely 2/2 hypoxia.    Will trend troponin,-very slight elevation and  continuous cardiac monitoring in ICU    2d ECHO pending-- consider cardiology consultation if clinically warranted.

## 2017-12-28 NOTE — ASSESSMENT & PLAN NOTE
Known problem with reportedly benign PET scan (results unavailable at this facility).  High risk for neoplasm.    Pulmonology consulted (followed outpatient for this problem by Dr. Sinclair).

## 2017-12-28 NOTE — CONSULTS
Last PFT - 11/2016           FEV1- 55 %    DLCO - 8 %    12/28/2017      Admit Date: 12/27/2017  Fatuma ADDISON Agustin  New Patient Consult    Chief Complaint   Patient presents with    Shortness of Breath     Transferred from [facility]       History of Present Illness:  Pt h/o pe in rll past with no recent anticoagg rx (takes megace), has copd on 4-5 lpm ox with more regular use last 4 months.  She follows with Dr Schulz - has dlco 8% past.  Pt uses resp rx with some improvement.  Pt lives alone and does own chores/cooking.    Pt Anatoliy day had acute sob, emt called, sat low and 02 adjusted and pt left home.  Not unusual for pt to have episodic sob/desats.    Yesterday, she had recurrance with some diaphoresis- sat 70's upon arrival to ER initially, subsequently to NSR.   Pt rx NIV, steroids, etc.  Ct chest done with no pe, has huge pulm artery. Emphysema apparent.      Pt has had no echo/cardiac eval.  She may have renal mass - see Dr Madden.  Has blood disorder (polycythemia)  followed by Dr Martin.  She has lost lt slowly with advancing age.  No chest pain nor cough/mucous nor wheeze, no h/o asthma as child.        PFSH:  Past Medical History:   Diagnosis Date    Anxiety     Cataract     Emphysema of lung     Polycythemia secondary to hypoxia 8/8/2017     Past Surgical History:   Procedure Laterality Date    EYE SURGERY      HYSTERECTOMY       Social History   Substance Use Topics    Smoking status: Former Smoker     Packs/day: 0.25     Years: 15.00     Types: Cigarettes     Quit date: 8/8/2016    Smokeless tobacco: Never Used    Alcohol use No     Family History   Problem Relation Age of Onset    Hypertension Mother     Cancer Mother      breast cancer     Heart disease Mother     Cancer Father      lung ca    Hypertension Sister     Hypertension Brother     Diabetes Brother     Hyperlipidemia Brother     Heart disease Brother     Cancer Brother      gastric   "    Review of patient's allergies indicates:   Allergen Reactions    Aspirin Other (See Comments)     Burns stomach, needs to be coated aspirin       Performance Status:Performance Status:The patient's activity level is housebound activities.    Review of Systems:  a review of eleven systems covering constitutional, Psych, Eye, HEENT, Respiratory, Cardiac, GI, , Musculoskeletal, Endocrine, Dermatologicwas negative except the above mentioned abnormalities and for any pertinent findings as listed below: gradual wt loss, anxious, rice, no chest pain , has had nausea with current illness.        Exam:Comprehensive exam done. BP (!) 97/59   Pulse 108   Temp 97.9 °F (36.6 °C) (Oral)   Resp (!) 22   Ht 4' 10" (1.473 m)   Wt 50 kg (110 lb 3.7 oz)   SpO2 (!) 10%   Breastfeeding? No   BMI 23.04 kg/m²   Exam included Vitals as listed, and patient's appearance and affect and alertness and mood, oral exam for yeast and hygiene and pharynx lesions and Mallapatti (M) score, neck with inspection for jvd and masses and thyroid abnormalities and lymph nodes (supraclavicular and infraclavicular nodes also examined and noted if abn), chest exam included symmetry and effort and fremitus and percussion and auscultation, cardiac exam included rhythm and gallops and murmur and rubs and jvd and edema, abdominal exam for mass and hepatosplenomegaly and tenderness and hernias and bowel sounds, Musculoskeletal exam with muscle tone and posture and mobility/gait and  strenght, and skin for rashes and cyanosis and pallor and turgor, extremity for clubbing.  Findings were normal except as listed below:  Frail lady on NIV, no distress, neck wnl, thyroid wnl, chest symmetric, no distress, nl fremitus/percussion, occ rales, cor is s 4/s1/s2/s3 with murmur clear, tachy and hard to characterize.  Soft abd with no hsmegaly nor mass, no clubbing nor edema, hard to communicate with niv.     Radiographs reviewed: view by direct vision huge " pulm art , emphysema seen, could have fibrosis, mild bronchiectasis, sm bilat effusion, very hyperinflated.  No results found for this or any previous visit.]    Labs       Recent Labs  Lab 12/28/17  0007 12/28/17  0407   WBC  --  4.40   HGB  --  14.4   HCT  --  44.9   PLT  --  119*   BAND  --  1.0   HGBA1C 5.5  --      Recent Labs  Lab 12/27/17  1845 12/28/17  0007  12/28/17  0407 12/28/17  0817 12/28/17  1143   NA  --   --   --  141  --   --    K  --   --   --  4.0  --   --    CL  --   --   --  105  --   --    CO2  --   --   --  21*  --   --    BUN  --   --   --  12  --   --    CREATININE  --   --   --  0.8  --   --    GLU  --   --   --  213*  --   --    CALCIUM  --   --   --  8.3*  --   --    MG  --   --   --  1.6  --   --    PHOS  --   --   --  3.6  --   --    AST  --   --   --  24  --   --    ALT  --   --   --  11  --   --    ALKPHOS  --   --   --  64  --   --    BILITOT  --   --   --  0.6  --   --    PROT  --   --   --  7.3  --   --    ALBUMIN  --   --   --  2.7*  --   --    CRP  --   --   --  25.2*  --   --    PROCAL  --  0.14  --   --   --   --    LACTATE  --  6.5*  < >  --  5.1*  --    TROPONINI 0.01 0.031*  --  0.034*  --  0.027*   CPK 92  --   --   --   --   --    CPKMB 5.0  --   --   --   --   --    BNP  --  1,708*  --   --   --   --    < > = values in this interval not displayed.  Recent Labs  Lab 12/27/17  1741   PH 7.381   PCO2 50.6*   PO2 62*   HCO3 30.0*     Microbiology Results (last 7 days)     Procedure Component Value Units Date/Time    Urine Culture [[id number]] Collected:  12/28/17 0450    Order Status:  Sent Specimen:  Urine from Urine, Clean Catch Updated:  12/28/17 0935    Culture, Respiratory with Gram Stain [[id number]]     Order Status:  No result Specimen:  Respiratory     Blood culture [[id number]]     Order Status:  Canceled Specimen:  Blood     Blood culture [[id number]]     Order Status:  Canceled Specimen:  Blood       Results for GUI VICENTE (MRN [id number]) as of 12/28/2017 16:43    Ref. Range 12/27/2017 17:41   POC PH Latest Ref Range: 7.35 - 7.45  7.381   POC PCO2 Latest Ref Range: 35 - 45 mmHg 50.6 (H)   POC PO2 Latest Ref Range: 80 - 100 mmHg 62 (L)   POC BE Latest Ref Range: -2 to 2 mmol/L 5   POC HCO3 Latest Ref Range: 24 - 28 mmol/L 30.0 (H)   POC SATURATED O2 Latest Ref Range: 95 - 100 % 90 (L)   POC TCO2 Latest Ref Range: 23 - 27 mmol/L 31 (H)   FiO2 Unknown 50   Sample Unknown ARTERIAL   DelSys Unknown CPAP/BiPAP   Allens Test Unknown Pass   Site Unknown RR   Mode Unknown BiPAP       Impression:  Active Hospital Problems    Diagnosis  POA    *Acute on chronic respiratory failure with hypoxia and hypercapnia [J96.21, J96.22]  Yes    Demand ischemia of myocardium [I24.8]  Yes    Hyperglycemia, drug-induced [R73.9, T50.905A]  Yes     Due to steroids       Community acquired pneumonia of right lower lobe of lung [J18.1]  Yes    Severe sepsis [A41.9, R65.20]  Yes    Pulmonary hypertension due to COPD [I27.23, J44.9]  Yes    Pulmonary nodules/lesions, multiple [R91.8]  Yes    Malnutrition of moderate degree [E44.0]  Yes    Right renal mass [N28.89]  Yes     Chronic    Thrombocytopenia [D69.6]  Yes     Chronic    Essential hypertension [I10]  Yes     Chronic    Chronic obstructive pulmonary disease with acute lower respiratory infection [J44.0]  Yes     Chronic      Resolved Hospital Problems    Diagnosis Date Resolved POA   No resolved problems to display.     Plan: expect very severe pulm htn, echo pending, severe lung dz apparent.  Diuresis may help if right heart not major source heart dz.  Doubt much reversibility from copd.  Cont abx, and copd rx. Support resp failure.  Discussed poor outlook with family - but complete picture not clear.

## 2017-12-28 NOTE — SUBJECTIVE & OBJECTIVE
Interval History: sitting up alert in bipap  C/o mid/low back pain and wants to get out of bed.   Has mild cough    Per RN -decreased urine output overnight; refused lopez   Lactate elevated.     Review of Systems   HENT: Negative for sinus pain, sinus pressure and sore throat.    Respiratory: Positive for cough and shortness of breath.    Cardiovascular: Positive for chest pain. Negative for leg swelling.   Genitourinary: Negative for difficulty urinating and dysuria.   Musculoskeletal: Positive for back pain. Negative for gait problem, myalgias and neck pain.     Objective:     Vital Signs (Most Recent):  Temp: 97.9 °F (36.6 °C) (12/28/17 0750)  Pulse: 108 (12/28/17 1133)  Resp: (!) 22 (12/28/17 1133)  BP: 102/64 (12/28/17 0800)  SpO2: 96 % (12/28/17 1133) Vital Signs (24h Range):  Temp:  [97.1 °F (36.2 °C)-98.2 °F (36.8 °C)] 97.9 °F (36.6 °C)  Pulse:  [103-137] 108  Resp:  [15-37] 22  SpO2:  [58 %-100 %] 96 %  BP: ()/(60-99) 102/64     Weight: 50 kg (110 lb 3.7 oz)  Body mass index is 23.04 kg/m².    Intake/Output Summary (Last 24 hours) at 12/28/17 1148  Last data filed at 12/28/17 0452   Gross per 24 hour   Intake          1991.25 ml   Output              150 ml   Net          1841.25 ml      Physical Exam   Constitutional: She is oriented to person, place, and time. She appears well-developed and well-nourished. No distress.   bp low    HENT:   Head: Normocephalic and atraumatic.   Mouth/Throat: Oropharynx is clear and moist.   Eyes: Conjunctivae and EOM are normal. Pupils are equal, round, and reactive to light.   Neck: Normal range of motion. Neck supple. No thyromegaly present.   Cardiovascular: Regular rhythm, normal heart sounds and intact distal pulses.  Tachycardia present.    No murmur heard.  Pulmonary/Chest: She has wheezes. She exhibits no tenderness.   Barrel Chest; prolonged expiratory phase.  Decreased breath sounds throughout with rales to RLL   Abdominal: Soft. Bowel sounds are normal.  She exhibits no distension. There is no tenderness.   Musculoskeletal: Normal range of motion. She exhibits no edema or tenderness (paraspinal lumbar bilat ).   Neurological: She is alert and oriented to person, place, and time. No cranial nerve deficit.   Skin: Skin is warm and dry. Capillary refill takes less than 2 seconds. No erythema.   Nail clubbing noted.   Psychiatric: She has a normal mood and affect. Her behavior is normal. Judgment and thought content normal.   Nursing note and vitals reviewed.      Significant Labs: All pertinent labs within the past 24 hours have been reviewed.    Significant Imaging: I have reviewed and interpreted all pertinent imaging results/findings within the past 24 hours.  cxr--enlarged heart hyperexpanded chest; ? Bibasilar infiltrates

## 2017-12-28 NOTE — PT/OT/SLP PROGRESS
Physical Therapy      Patient Name:  Fatuma Velez   MRN:  [id number]    Patient not seen today secondary to  (PT consult acknowledged- PT hebert fuller- nurse Desiree stated pt not ready for PT- on BIPAP with easy desaturation). Will follow-up 12-.    Dee Dee Zafar, PT

## 2017-12-28 NOTE — ASSESSMENT & PLAN NOTE
Chronic-- BP running low.    Hold antihypertensives and monitor BP closely, resuming medications as clinically appropriate.

## 2017-12-28 NOTE — PLAN OF CARE
Problem: Nutrition, Imbalanced: Inadequate Oral Intake (Adult)  Goal: Identify Related Risk Factors and Signs and Symptoms  Related risk factors and signs and symptoms are identified upon initiation of Human Response Clinical Practice Guideline (CPG)  Outcome: Ongoing (interventions implemented as appropriate)  Recommendations  Recommendation/Intervention: 1.) Continue with Cardiac diet 2.) Recommend Boost plus TID   Goals: 1.) patient will meet >50% of EEN while admitted  Nutrition Goal Status: new  Communication of RD Recs: reviewed with RN (sticky note)

## 2017-12-28 NOTE — ASSESSMENT & PLAN NOTE
Patient with severe pulmonary HTN and COPD with concern for acute pulmonary infection-- significant hypoxia despite 4-5 L O2 supplementation at home.  Requiring NIPPV for adequate oxygenation.  Continue bipap, IV steroids.  Scheduled bronchodilators.  Broad spectrum abx for now.  Consult pulmonology for additional recommendations.  -continuing care.   CTA chest -neg for PE. Has PNA RLL-consult slp for eval for aspiration when improved.

## 2017-12-28 NOTE — HPI
Fatuma Velez is a 77-year-old female with a past medical history of COPD on home oxygen, polycythemia, pulmonary HTN, and anxiety.  She was admitted to the service of hospital medicine with acute on chronic respiratory failure.  She was transferred from [facility] for pulmonology services.  She presented to the ED there with acutely worsened SOB that occurred today while getting up to ambulate.  Her family reports prior episode similar to this on Blanket day.  She was noted to be wheezing and diaphoretic.  She reports compliance with continuous home O2 therapy (4-5L), as well as routine use of inhalers as prescribed.  She denies any cough, sputum production, or chest pain.  She was found to be Hypoxic on EMS arrival with O2 sats in the 70s.  She required bipap in ED at [facility] and improved with steroids, nebulizer, and NIPPV.  Dr. Portillo accepted the patient for consultation.  She received a dose of rocephin prior to transfer and blood cultures were drawn.  Patient currently reports feeling better on bipap.  Denies any chest pain, but endorses occasional palpitations and recent leg swelling (was started on lasix which she has been compliant with).  Her daughters and sisters are at bedside and assist with history provision.  She was recently found to have pulmonary nodules and underwent PET scan per Dr. Sinclair which daughter describes as normal.  She is due to follow up with him next month for repeat imaging and decision regarding further evaluation and management.  The patient lives alone.  She quit smoking 1 year prior.  Other pertinent medical history as below:

## 2017-12-28 NOTE — ASSESSMENT & PLAN NOTE
With resultant respiratory failure and severe lactic acidosis.  CTA report mentions PNA vs. Atelectasis. Given severity of illness and respiratory failure will cover broadly with Vanc/zosyn/azithromycin.  Check procalcitonin and consider de-escalating abx as appropriate.  Pulmonology consultation pending.

## 2017-12-28 NOTE — PROGRESS NOTES
[facility] Hospital Medicine  Progress Note    Patient Name: Fatuma Velez  MRN: [id number]  Patient Class: IP- Inpatient   Admission Date: 12/27/2017  Length of Stay: 1 days  Attending Physician: Moan Pena MD  Primary Care Provider: Henna Kemp MD        Subjective:     Principal Problem:Acute on chronic respiratory failure with hypoxia and hypercapnia    HPI:  Fatuma Velez is a 77-year-old female with a past medical history of COPD on home oxygen, polycythemia, pulmonary HTN, and anxiety.  She was admitted to the service of hospital medicine with acute on chronic respiratory failure.  She was transferred from [facility] for pulmonology services.  She presented to the ED there with acutely worsened SOB that occurred today while getting up to ambulate.  Her family reports prior episode similar to this on [place] day.  She was noted to be wheezing and diaphoretic.  She reports compliance with continuous home O2 therapy (4-5L), as well as routine use of inhalers as prescribed.  She denies any cough, sputum production, or chest pain.  She was found to be Hypoxic on EMS arrival with O2 sats in the 70s.  She required bipap in ED at [facility] and improved with steroids, nebulizer, and NIPPV.  Dr. Portillo accepted the patient for consultation.  She received a dose of rocephin prior to transfer and blood cultures were drawn.  Patient currently reports feeling better on bipap.  Denies any chest pain, but endorses occasional palpitations and recent leg swelling (was started on lasix which she has been compliant with).  Her daughters and sisters are at bedside and assist with history provision.  She was recently found to have pulmonary nodules and underwent PET scan per Dr. Sinclair which daughter describes as normal.  She is due to follow up with him next month for repeat imaging and decision regarding further evaluation and management.  The patient lives alone.  She quit smoking 1 year  prior.  Other pertinent medical history as below:    Hospital Course:  No notes on file    Interval History: sitting up alert in bipap  C/o mid/low back pain and wants to get out of bed.   Has mild cough    Per RN -decreased urine output overnight; refused lopez   Lactate elevated.     Review of Systems   HENT: Negative for sinus pain, sinus pressure and sore throat.    Respiratory: Positive for cough and shortness of breath.    Cardiovascular: Positive for chest pain. Negative for leg swelling.   Genitourinary: Negative for difficulty urinating and dysuria.   Musculoskeletal: Positive for back pain. Negative for gait problem, myalgias and neck pain.     Objective:     Vital Signs (Most Recent):  Temp: 97.9 °F (36.6 °C) (12/28/17 0750)  Pulse: 108 (12/28/17 1133)  Resp: (!) 22 (12/28/17 1133)  BP: 102/64 (12/28/17 0800)  SpO2: 96 % (12/28/17 1133) Vital Signs (24h Range):  Temp:  [97.1 °F (36.2 °C)-98.2 °F (36.8 °C)] 97.9 °F (36.6 °C)  Pulse:  [103-137] 108  Resp:  [15-37] 22  SpO2:  [58 %-100 %] 96 %  BP: ()/(60-99) 102/64     Weight: 50 kg (110 lb 3.7 oz)  Body mass index is 23.04 kg/m².    Intake/Output Summary (Last 24 hours) at 12/28/17 1148  Last data filed at 12/28/17 0452   Gross per 24 hour   Intake          1991.25 ml   Output              150 ml   Net          1841.25 ml      Physical Exam   Constitutional: She is oriented to person, place, and time. She appears well-developed and well-nourished. No distress.   bp low    HENT:   Head: Normocephalic and atraumatic.   Mouth/Throat: Oropharynx is clear and moist.   Eyes: Conjunctivae and EOM are normal. Pupils are equal, round, and reactive to light.   Neck: Normal range of motion. Neck supple. No thyromegaly present.   Cardiovascular: Regular rhythm, normal heart sounds and intact distal pulses.  Tachycardia present.    No murmur heard.  Pulmonary/Chest: She has wheezes. She exhibits no tenderness.   Barrel Chest; prolonged expiratory  phase.  Decreased breath sounds throughout with rales to RLL   Abdominal: Soft. Bowel sounds are normal. She exhibits no distension. There is no tenderness.   Musculoskeletal: Normal range of motion. She exhibits no edema or tenderness (paraspinal lumbar bilat ).   Neurological: She is alert and oriented to person, place, and time. No cranial nerve deficit.   Skin: Skin is warm and dry. Capillary refill takes less than 2 seconds. No erythema.   Nail clubbing noted.   Psychiatric: She has a normal mood and affect. Her behavior is normal. Judgment and thought content normal.   Nursing note and vitals reviewed.      Significant Labs: All pertinent labs within the past 24 hours have been reviewed.    Significant Imaging: I have reviewed and interpreted all pertinent imaging results/findings within the past 24 hours.  cxr--enlarged heart hyperexpanded chest; ? Bibasilar infiltrates     Assessment/Plan:      * Acute on chronic respiratory failure with hypoxia and hypercapnia    Patient with severe pulmonary HTN and COPD with concern for acute pulmonary infection-- significant hypoxia despite 4-5 L O2 supplementation at home.  Requiring NIPPV for adequate oxygenation.  Continue bipap, IV steroids.  Scheduled bronchodilators.  Broad spectrum abx for now.  Consult pulmonology for additional recommendations.  -continuing care.   CTA chest -neg for PE. Has PNA RLL-consult slp for eval for aspiration when improved.           Hyperglycemia, drug-induced      Patient on high dose steroids  accuck and iss         Demand ischemia of myocardium    Troponin rising (mildly elevated on arrival here) likely 2/2 hypoxia.    Will trend troponin,-very slight elevation and  continuous cardiac monitoring in ICU    2d ECHO pending-- consider cardiology consultation if clinically warranted.          Malnutrition of moderate degree    Continue megace.  Consult dietician.  Add supplements           Pulmonary nodules/lesions, multiple    Known  problem with reportedly benign PET scan (results unavailable at this facility).  High risk for neoplasm.    Pulmonology consulted (followed outpatient for this problem by Dr. Sinclair).          Pulmonary hypertension due to COPD    Severe.  Continue supplemental O2.  Check 2DEcho.  Additional recommendations as per pulmonology.          Severe sepsis    Fatuma Velez is a 77 y.o. female who presents to the Emergency Department       This patient does have evidence of infective focus  My overall impression is severe sepsis.  Antibiotics given-   Antibiotics     Start     Stop Route Frequency Ordered    12/28/17 2300  azithromycin 500 mg in dextrose 5 % 250 mL IVPB (ready to mix system)      -- IV Every 24 hours (non-standard times) 12/28/17 0031    12/28/17 1000  piperacillin-tazobactam 4.5 g in sodium chloride 0.9% 100 mL IVPB (ready to mix system)      -- IV Every 8 hours (non-standard times) 12/28/17 0908    12/28/17 0230  vancomycin (VANCOCIN) 750 mg in dextrose 5 % 250 mL IVPB      -- IV Every 24 hours (non-standard times) 12/28/17 0204          Severe Sepsis only-  Latest labs reviewed, they are-    Recent Labs  Lab 12/28/17  0407   BILITOT 0.6       Recent Labs  Lab 12/28/17  0007 12/28/17  0406 12/28/17  0817   LACTATE 6.5* 4.8* 5.1*       Recent Labs  Lab 12/27/17  1741   PH 7.381   PO2 62*   PCO2 50.6*   HCO3 30.0*   BE 5       Organ dysfunction indicated by Lactic acidosis and Acute respiratory failure    Follow up lactate needed- in 4 hours  Increased to 5.1--ivf given 500 cc bolus  Septic Shock only-  Shock with decreased perfusion noted, Fluid challenge  was given at 30cc/kg    Post- resuscitation assessment- (Done after fluids given for shock)  Vital signs post fluid administrations were-    Temp Readings from Last 1 Encounters:   12/28/17 97.9 °F (36.6 °C) (Oral)     BP Readings from Last 1 Encounters:   12/28/17 102/64     Pulse Readings from Last 1 Encounters:   12/28/17 108       Post bolus  perfusion assessment -see above   rebolus gentle and monitor lactate     -Follow blood cultures   and procalcitonin.--wnl        Community acquired pneumonia of right lower lobe of lung    With resultant respiratory failure and severe lactic acidosis.  CTA report mentions PNA vs. Atelectasis. Given severity of illness and respiratory failure will cover broadly with Vanc/zosyn/azithromycin.  Check procalcitonin and consider de-escalating abx as appropriate.  Pulmonology consultation pending.          Right renal mass    No urology f/u.  Will need to establish with urologist for additional work up and management.          Chronic obstructive pulmonary disease with acute lower respiratory infection    Severe.  IV steroids, scheduled bronchodilator therapy. Consult pulmonology.          Essential hypertension    Chronic-- BP running low.    Hold antihypertensives and monitor BP closely, resuming medications as clinically appropriate.            VTE Risk Mitigation         Ordered     enoxaparin injection 40 mg  Daily     Route:  Subcutaneous        12/28/17 0021     Medium Risk of VTE  Once      12/28/17 0021          Critical care time spent on the evaluation and treatment of severe organ dysfunction, review of pertinent labs and imaging studies, discussions with consulting providers and discussions with patient/family: 35 minutes.    Mona Pena MD  Department of Hospital Medicine   [facility]

## 2017-12-28 NOTE — PROGRESS NOTES
Pt vomiting up corn, complains of epigastric cramping pain. Notified Dr. Pena. Orders received for GI cocktail and Carafate.

## 2017-12-28 NOTE — SUBJECTIVE & OBJECTIVE
Past Medical History:   Diagnosis Date    Anxiety     Cataract     Emphysema of lung     Polycythemia secondary to hypoxia 8/8/2017       Past Surgical History:   Procedure Laterality Date    EYE SURGERY      HYSTERECTOMY         Review of patient's allergies indicates:   Allergen Reactions    Aspirin Other (See Comments)     Burns stomach, needs to be coated aspirin       Current Facility-Administered Medications on File Prior to Encounter   Medication    [COMPLETED] 0.9%  NaCl infusion    [COMPLETED] albuterol-ipratropium 2.5mg-0.5mg/3mL nebulizer solution 3 mL    azithromycin 500 mg in dextrose 5 % 250 mL IVPB (ready to mix system)    [COMPLETED] cefTRIAXone (ROCEPHIN) 1 g in dextrose 5 % 50 mL IVPB    [COMPLETED] methylPREDNISolone sodium succinate injection 125 mg    [COMPLETED] omnipaque 350 iohexol 100 mL     Current Outpatient Prescriptions on File Prior to Encounter   Medication Sig    albuterol (VENTOLIN HFA) 90 mcg/actuation inhaler Inhale 2 puffs into the lungs every 6 (six) hours as needed for Wheezing. Rescue    amLODIPine (NORVASC) 5 MG tablet TAKE 1 TABLET(5 MG) BY MOUTH EVERY DAY    aspirin (ECOTRIN) 81 MG EC tablet Take 81 mg by mouth once daily.    busPIRone (BUSPAR) 5 MG Tab Take 1 tablet (5 mg total) by mouth 2 (two) times daily as needed.    cyanocobalamin (VITAMIN B-12) 500 MCG tablet Take 500 mcg by mouth once daily.    furosemide (LASIX) 20 MG tablet Take 1 tablet (20 mg total) by mouth once daily.    megestrol (MEGACE) 40 MG Tab TAKE 1 TABLET(40 MG) BY MOUTH EVERY DAY    potassium chloride (MICRO-K) 10 MEQ CpSR TAKE 1 CAPSULE(10 MEQ) BY MOUTH EVERY DAY    umeclidinium-vilanterol (ANORO ELLIPTA) 62.5-25 mcg/actuation DsDv Inhale 1 puff into the lungs once daily. Controller     Family History     Problem Relation (Age of Onset)    Cancer Mother, Father, Brother    Diabetes Brother    Heart disease Mother, Brother    Hyperlipidemia Brother    Hypertension Mother, Sister,  Brother        Social History Main Topics    Smoking status: Former Smoker     Packs/day: 0.25     Years: 15.00     Types: Cigarettes     Quit date: 8/8/2016    Smokeless tobacco: Never Used    Alcohol use No    Drug use: No    Sexual activity: No     Review of Systems   Constitutional: Positive for appetite change, diaphoresis and fatigue. Negative for activity change, chills and fever.   HENT: Negative for congestion, postnasal drip, sinus pressure, sore throat and trouble swallowing.    Eyes: Negative for photophobia and visual disturbance.   Respiratory: Positive for shortness of breath and wheezing. Negative for cough and chest tightness.    Cardiovascular: Positive for leg swelling (chronic, bilateral, improved.). Negative for chest pain and palpitations.   Gastrointestinal: Negative for abdominal distention, constipation, diarrhea, nausea and vomiting.   Genitourinary: Negative for dysuria, flank pain, frequency and urgency.   Musculoskeletal: Negative for arthralgias, myalgias and neck pain.   Skin: Negative for color change.   Neurological: Negative for dizziness, weakness, light-headedness and headaches.   Psychiatric/Behavioral: Negative for agitation and confusion. The patient is not nervous/anxious.      Objective:     Vital Signs (Most Recent):  Temp: 97.1 °F (36.2 °C) (12/27/17 2302)  Pulse: (!) 114 (12/27/17 2332)  Resp: (!) 24 (12/27/17 2323)  BP: 105/73 (12/27/17 2332)  SpO2: 100 % (12/27/17 2332) Vital Signs (24h Range):  Temp:  [97.1 °F (36.2 °C)-98.2 °F (36.8 °C)] 97.1 °F (36.2 °C)  Pulse:  [108-137] 114  Resp:  [15-37] 24  SpO2:  [58 %-100 %] 100 %  BP: ()/(69-99) 105/73     Weight: 46.7 kg (103 lb)  Body mass index is 21.53 kg/m².    Physical Exam   Constitutional: She is oriented to person, place, and time. She appears well-developed and well-nourished. No distress.   HENT:   Head: Normocephalic and atraumatic.   Eyes: Conjunctivae and EOM are normal. Pupils are equal, round, and  reactive to light.   Neck: Normal range of motion. Neck supple. No thyromegaly present.   Cardiovascular: Regular rhythm, normal heart sounds and intact distal pulses.  Tachycardia present.    No murmur heard.  Pulmonary/Chest: She has wheezes. She exhibits no tenderness.   Barrel Chest; prolonged expiratory phase.  Decreased breath sounds throughout with rales to RLL   Abdominal: Soft. Bowel sounds are normal. She exhibits no distension. There is no tenderness.   Musculoskeletal: Normal range of motion. She exhibits no edema or tenderness.   Neurological: She is alert and oriented to person, place, and time. No cranial nerve deficit.   Skin: Skin is warm and dry. Capillary refill takes less than 2 seconds. No erythema.   Nail clubbing noted.   Psychiatric: She has a normal mood and affect. Her behavior is normal. Judgment and thought content normal.         CRANIAL NERVES     CN III, IV, VI   Pupils are equal, round, and reactive to light.  Extraocular motions are normal.        Significant Labs:   CBC:   Recent Labs  Lab 12/27/17  1704 12/27/17  1741   WBC 5.80  --    HGB 15.3  --    HCT 47.8 45   *  --      CMP:     Recent Labs  Lab 12/27/17  1711      K 3.1*   CL 98*   CO2 33*   *   BUN 14   CREATININE 0.8   CALCIUM 8.8   PROT 7.6   ALBUMIN 3.2*   BILITOT 0.9   ALKPHOS 59   AST 36   ALT 12*   ANIONGAP 8   EGFRNONAA >60.0     Lactic Acid:     Recent Labs  Lab 12/27/17  1745 12/28/17  0007   LACTATE 3.8* 6.5*   Troponin:   Recent Labs  Lab 12/27/17  1845   TROPONINI 0.01     (Portion of the above labs drawn at and reviewed from prior facility).    Significant Imaging:   CXR: I reviewed images. Increased opacity to RLL concerning for a pneumonia.  CTA Chest: (Vrad read from prior facility):   -No pulmonary thromboembolism.  -Aneurysmal dilation of the pulmonary trunk and pulmonary arteries that can be seen with severe pulmonary artery hypertension.  -Bilateral pulmonary nodules which could  represent scarring or neoplasm.  Correlation with prior imaging recommended.  - Severe emphysematous change and fibrosis.  -Right lower lobe consolidation, likely atelectasis but superimposed pneumonia cannot be excluded.    EKG: I reviewed.   with RIGHT axis deviation.  No ischemic ST changes noted.

## 2017-12-28 NOTE — PLAN OF CARE
Problem: Patient Care Overview  Goal: Plan of Care Review  Outcome: Ongoing (interventions implemented as appropriate)  Remains on BIPAp stable sung aerosol tx well, unable to wean off, sats decreased into 70's

## 2017-12-28 NOTE — PROGRESS NOTES
Took patient off bipap at 0752 to nasal cannula at 5 L. Pt eating breakfast/taking pills.  O2 sat steadily declined to 78% before putting bipap back on at 0806.

## 2017-12-28 NOTE — PROGRESS NOTES
Post- resuscitation assessment- (Done after fluids given for shock)  Vital signs post fluid administrations were-    Temp Readings from Last 1 Encounters:   12/28/17 97.8 °F (36.6 °C) (Axillary)     BP Readings from Last 1 Encounters:   12/28/17 96/63     Pulse Readings from Last 1 Encounters:   12/28/17 103       Perfusion assessment post bolus shows Good distal pulses, adequate capillary refill    Will Not start Pressors- Levophed for MAP of 65

## 2017-12-28 NOTE — ASSESSMENT & PLAN NOTE
Severe.  Continue supplemental O2.  Check 2DEcho.  Additional recommendations as per pulmonology.

## 2017-12-28 NOTE — NURSING
Transferred on stretcher from ER. Assisted to transfer self onto ICU bed. Very SOB and tachypneic with 's  on non rebreather. BIPAP applied per respiratory tech. Denies any pain or discomfort. Refusing to have Clark placed.

## 2017-12-28 NOTE — CONSULTS
[facility]  Adult Nutrition  Consult Note    SUMMARY     Recommendations  Recommendation/Intervention: 1.) Continue with Cardiac diet 2.) Recommend Boost plus TID   Goals: 1.) patient will meet >50% of EEN while admitted  Nutrition Goal Status: new  Communication of RD Recs: reviewed with RN (sticky note)    1. Acute on chronic respiratory failure with hypoxia    2. COPD with acute exacerbation    3. Pulmonary hypertension    4. Shortness of breath    5. Acute on chronic respiratory failure with hypoxia and hypercapnia      Past Medical History:   Diagnosis Date    Anxiety     Cataract     Emphysema of lung     Polycythemia secondary to hypoxia 8/8/2017       Reason for Assessment  Reason for Assessment: nurse/nurse practitioner consult  Interdisciplinary Rounds: attended  General Information Comments: Admits in acute on chronic respiratory failure. Patient alert and oriented on bipap. Rd consulted for poor appetite. Breakfast tray at bedside untouched. Patient reports she was not eating at home for over a week prior to admit. Reports weight loss, however per chart review weight appears stable. Mild muscle loss noted. Edema noted. Interested in boost with meals.        Nutrition Prescription Ordered  Current Diet Order: Cardiac diet      Evaluation of Received Nutrients/Fluid Intake  IV Fluid (mL): 2400  % Intake of Estimated Energy Needs: 0 - 25 %  % Meal Intake: 0%     Nutrition Risk Screen  Nutrition Risk Screen: other (see comments) (poor appetite)    Nutrition/Diet History  Food Preferences: no cultural or Protestant food preferences. NKFA.   Factors Affecting Nutritional Intake: decreased appetite (bipap)    Labs/Tests/Procedures/Meds  Diagnostic Test/Procedure Review: reviewed, pertinent  Pertinent Labs Reviewed: reviewed, pertinent   BMP  Lab Results   Component Value Date     12/28/2017    K 4.0 12/28/2017     12/28/2017    CO2 21 (L) 12/28/2017    BUN 12 12/28/2017     "CREATININE 0.8 2017    CALCIUM 8.3 (L) 2017    ANIONGAP 15 2017    ESTGFRAFRICA >60 2017    EGFRNONAA >60 2017     Lab Results   Component Value Date    ALBUMIN 2.7 (L) 2017     Lab Results   Component Value Date    CALCIUM 8.3 (L) 2017    PHOS 3.6 2017     No results for input(s): POCTGLUCOSE in the last 24 hours.     Pertinent Medications Reviewed: reviewed  Scheduled Meds:   albuterol-ipratropium 2.5mg-0.5mg/3mL  3 mL Nebulization Q4H    aspirin  81 mg Oral Daily    azithromycin  500 mg Intravenous Q24H    busPIRone  5 mg Oral BID    cyanocobalamin  500 mcg Oral Daily    enoxaparin  40 mg Subcutaneous Daily    megestrol  40 mg Oral Daily    methylPREDNISolone sodium succinate  80 mg Intravenous Q8H    piperacillin-tazobactam 4.5 g in sodium chloride 0.9% 100 mL IVPB (ready to mix system)  4.5 g Intravenous Q8H    senna-docusate 8.6-50 mg  1 tablet Oral BID    vancomycin (VANCOCIN) IVPB (custom)  750 mg Intravenous Q24H     Continuous Infusions:   sodium chloride 0.9% 100 mL/hr at 17 0452         Physical Findings  Overall Physical Appearance: loss of muscle mass, on oxygen therapy  Oral/Mouth Cavity: tooth/teeth missing  Skin: edema (+1. Tobi score 17)    Anthropometrics  Temp: 97.9 °F (36.6 °C)  Height: 4' 10"  Weight Method: Bed Scale  Weight: 50 kg (110 lb 3.7 oz)  Ideal Body Weight (IBW), Female: 90 lb  % Ideal Body Weight, Female (lb): 122.48 lb  BMI (Calculated): 23.1  BMI Grade: 18.5-24.9 - normal  Usual Body Weight (UBW), k.36 kg  % Usual Body Weight: 97.56  % Weight Change From Usual Weight: -2.65 %      Estimated/Assessed Needs  Weight Used For Calorie Calculations: 50 kg (110 lb 3.7 oz)   Energy Need Method: [place]-St Jeor (x1.3-1.5)=2707-3615  RMR ([place]-St. Jeor Equation): 864.75  Weight Used For Protein Calculations: 50 kg (110 lb 3.7 oz)  1.2 gm Protein (gm): 60.13 and 1.5 gm Protein (gm): 75.16  Fluid Need Method: RDA " Method (or per MD)      Assessment and Plan    Malnutrition of moderate degree    Related to (etiology):   Decreased appetite    Signs and Symptoms (as evidenced by):   1.) EEN <75% >1 week prior to admit  2.) +1 edema  3.) Mild muscle loss      Interventions/Recommendations (treatment strategy):  Continue with diet, send boost plus TID     Nutrition Diagnosis Status:   New              Monitor and Evaluation  Food and Nutrient Intake: energy intake, food and beverage intake  Food and Nutrient Adminstration: diet order  Anthropometric Measurements: weight, weight change, body mass index  Biochemical Data, Medical Tests and Procedures: electrolyte and renal panel, glucose/endocrine profile, lipid profile  Nutrition-Focused Physical Findings: overall appearance    Nutrition Risk  Level of Risk:  (x2 weekly)    Nutrition Follow-Up  RD Follow-up?: Yes    Discharge Planning: unable to determine at this time

## 2017-12-28 NOTE — ASSESSMENT & PLAN NOTE
Troponin rising (mildly elevated on arrival here) likely 2/2 hypoxia.  Will trend troponin, continuous cardiac monitoring.  2d ECHO pending-- consider cardiology consultation if clinically warranted.

## 2017-12-28 NOTE — NURSING
Results for JULES GUI ADDISON (MRN [id number]) as of 12/28/2017 06:45   Ref. Range 12/28/2017 04:06   Lactate, Adebayo Latest Ref Range: 0.5 - 2.2 mmol/L 4.8 (HH)   Trending down. Lory PALUMBO  aware.

## 2017-12-28 NOTE — PLAN OF CARE
11:25 a.m. - met with pt who was on the bipap and difficult to hear.  She verified okay for me to call her daughter to assist with the assessment.     11:30 a.m. - phone contact with daughter Karen, [phone] who provided information for the assessment. Pt, who lives by herself, is independent with her ADL's.  Pt is on 4 to 5 liters continuous home oxygen and denies having home health services.  Pt's family provides transportation.  Pt's PCP is Dr. Kemp, hematologist is Dr. Venkat Montague and insurance is Friday.  Pt's daughter is requesting that pt discharge home with home health.  Plan is for pt to discharge to Karen's home at [address] [place] [address].  Obtained signature for the disclosure form.  Daughter does not have preference for the home health agency.      12/28/17 1136   Discharge Assessment   Assessment Type Discharge Planning Assessment   Confirmed/corrected address and phone number on facesheet? Yes   Assessment information obtained from? Other  (Pt's daughter Karen by phone.)   Prior to hospitilization cognitive status: Alert/Oriented   Prior to hospitalization functional status: Independent   Current cognitive status: Alert/Oriented   Current Functional Status: Needs Assistance   Lives With alone   Able to Return to Prior Arrangements unable to determine at this time (comments)   Is patient able to care for self after discharge? Unable to determine at this time (comments)   Who are your caregiver(s) and their phone number(s)? (angi Jones, [phone]/Viri Montanez, [phone])   Patient's perception of discharge disposition home health   Readmission Within The Last 30 Days no previous admission in last 30 days   Patient currently being followed by outpatient case management? No   Patient currently receives any other outside agency services? No   Equipment Currently Used at Home oxygen   Do you have any problems affording any of your prescribed medications? No  (pharmacy  is [facility] in [place])   Is the patient taking medications as prescribed? yes   Does the patient have transportation home? Yes   Does the patient receive services at the [facility]? No   Discharge Plan A Home Health   Patient/Family In Agreement With Plan yes

## 2017-12-28 NOTE — SIGNIFICANT EVENT
Results for GUI VICENTE (MRN [id number]) as of 12/28/2017 03:27   Ref. Range 12/28/2017 00:07   Troponin I Latest Ref Range: 0.000 - 0.026 ng/mL 0.031 (H)   BNP Latest Ref Range: 0 - 99 pg/mL 1,708 (H)   Lactate, Adebayo Latest Ref Range: 0.5 - 2.2 mmol/L 6.5 (HH)   Lorin Gillespie NP aware.

## 2017-12-28 NOTE — ASSESSMENT & PLAN NOTE
Fatuma Velez is a 77 y.o. female who presents to the Emergency Department       This patient does have evidence of infective focus  My overall impression is severe sepsis.  Antibiotics given-   Antibiotics     Start     Stop Route Frequency Ordered    12/28/17 2300  azithromycin 500 mg in dextrose 5 % 250 mL IVPB (ready to mix system)      -- IV Every 24 hours (non-standard times) 12/28/17 0031    12/28/17 1000  piperacillin-tazobactam 4.5 g in sodium chloride 0.9% 100 mL IVPB (ready to mix system)      -- IV Every 8 hours (non-standard times) 12/28/17 0908    12/28/17 0230  vancomycin (VANCOCIN) 750 mg in dextrose 5 % 250 mL IVPB      -- IV Every 24 hours (non-standard times) 12/28/17 0204          Severe Sepsis only-  Latest labs reviewed, they are-    Recent Labs  Lab 12/28/17  0407   BILITOT 0.6       Recent Labs  Lab 12/28/17  0007 12/28/17  0406 12/28/17  0817   LACTATE 6.5* 4.8* 5.1*       Recent Labs  Lab 12/27/17  1741   PH 7.381   PO2 62*   PCO2 50.6*   HCO3 30.0*   BE 5       Organ dysfunction indicated by Lactic acidosis and Acute respiratory failure    Follow up lactate needed- in 4 hours  Increased to 5.1--ivf given 500 cc bolus  Septic Shock only-  Shock with decreased perfusion noted, Fluid challenge  was given at 30cc/kg    Post- resuscitation assessment- (Done after fluids given for shock)  Vital signs post fluid administrations were-    Temp Readings from Last 1 Encounters:   12/28/17 97.9 °F (36.6 °C) (Oral)     BP Readings from Last 1 Encounters:   12/28/17 102/64     Pulse Readings from Last 1 Encounters:   12/28/17 108       Post bolus perfusion assessment -see above   rebolus gentle and monitor lactate     -Follow blood cultures   and procalcitonin.--wnl

## 2017-12-28 NOTE — ASSESSMENT & PLAN NOTE
Related to (etiology):   Decreased appetite    Signs and Symptoms (as evidenced by):   1.) EEN <75% >1 week prior to admit  2.) +1 edema  3.) Mild muscle loss      Interventions/Recommendations (treatment strategy):  Continue with diet, send boost plus TID     Nutrition Diagnosis Status:   New

## 2017-12-28 NOTE — ASSESSMENT & PLAN NOTE
Fatuma Velez is a 77 y.o. female who presents to the Emergency Department       This patient does have evidence of infective focus  My overall impression is severe sepsis.  Antibiotics given-   Antibiotics     Start     Stop Route Frequency Ordered    12/28/17 2300  azithromycin 500 mg in dextrose 5 % 250 mL IVPB (ready to mix system)      -- IV Every 24 hours (non-standard times) 12/28/17 0031    12/28/17 0230  vancomycin (VANCOCIN) 750 mg in dextrose 5 % 250 mL IVPB      -- IV Every 12 hours (non-standard times) 12/28/17 0116    12/28/17 0200  piperacillin-tazobactam 4.5 g in dextrose 5 % 100 mL IVPB (ready to mix system)      -- IV Every 8 hours (non-standard times) 12/28/17 0052          Severe Sepsis only-  Latest labs reviewed, they are-    Recent Labs  Lab 12/27/17  1711   BILITOT 0.9       Recent Labs  Lab 12/27/17  1745 12/28/17  0007   LACTATE 3.8* 6.5*       Recent Labs  Lab 12/27/17  1741   PH 7.381   PO2 62*   PCO2 50.6*   HCO3 30.0*   BE 5       Organ dysfunction indicated by Lactic acidosis and Acute respiratory failure    Follow up lactate needed- in 4 hours    Septic Shock only-  Shock with decreased perfusion noted, Fluid challenge  was given at 30cc/kg    Post- resuscitation assessment- (Done after fluids given for shock)  Vital signs post fluid administrations were-    Temp Readings from Last 1 Encounters:   12/28/17 97.8 °F (36.6 °C) (Axillary)     BP Readings from Last 1 Encounters:   12/28/17 91/61     Pulse Readings from Last 1 Encounters:   12/28/17 (!) 118       Post bolus perfusion assessment pending.    -Follow blood cultures and procalcitonin.

## 2017-12-28 NOTE — ASSESSMENT & PLAN NOTE
Patient with severe pulmonary HTN and COPD with concern for acute pulmonary infection-- significant hypoxia despite 4-5 L O2 supplementation at home.  Requiring NIPPV for adequate oxygenation.  Continue bipap, IV steroids.  Scheduled bronchodilators.  Broad spectrum abx for now.  Consult pulmonology for additional recommendations.

## 2017-12-28 NOTE — CONSULTS
Fatuma Velez [id number] is a 77 y.o. female who has been consulted for vancomycin dosing.    The patient has the following labs:     Date Creatinine (mg/dl)    BUN WBC Count   12/28/2017 Estimated Creatinine Clearance: 45 mL/min (based on SCr of 0.8 mg/dL). Lab Results   Component Value Date    BUN 14 12/27/2017     Lab Results   Component Value Date    WBC 5.80 12/27/2017        Current weight is 50 kg (110 lb 3.7 oz)    Pneumonia      The patient will be started on vancomycin at a dose of 750 mg every 24 hours (15mg/kg/dose).  The vancomycin trough has been ordered for 12/30 at 0200.      Patient will be followed by pharmacy for changes in renal function, toxicity, and efficacy.   Thank you for allowing us to participate in this patient's care.     Sherry Guerrero

## 2017-12-28 NOTE — H&P
[facility] Medicine  History & Physical    Patient Name: Fatuma Velez  MRN: [id number]  Admission Date: 12/27/2017  Attending Physician: Mona Pena MD   Primary Care Provider: Henna Kemp MD         Patient information was obtained from patient, relative(s) and ER records, past medical records (prior facility).  Subjective:     Principal Problem:Acute on chronic respiratory failure with hypoxia and hypercapnia    Chief Complaint:   Chief Complaint   Patient presents with    Shortness of Breath     Transferred from [facility] for Pneumonia        HPI: Fatmua Velez is a 77-year-old female with a past medical history of COPD on home oxygen, polycythemia, pulmonary HTN, and anxiety.  She was admitted to the service of hospital medicine with acute on chronic respiratory failure.  She was transferred from [facility] for pulmonology services.  She presented to the ED there with acutely worsened SOB that occurred today while getting up to ambulate.  Her family reports prior episode similar to this on Lys[place] day.  She was noted to be wheezing and diaphoretic.  She reports compliance with continuous home O2 therapy (4-5L), as well as routine use of inhalers as prescribed.  She denies any cough, sputum production, or chest pain.  She was found to be Hypoxic on EMS arrival with O2 sats in the 70s.  She required bipap in ED at [facility] and improved with steroids, nebulizer, and NIPPV.  Dr. Portillo accepted the patient for consultation.  She received a dose of rocephin prior to transfer and blood cultures were drawn.  Patient currently reports feeling better on bipap.  Denies any chest pain, but endorses occasional palpitations and recent leg swelling (was started on lasix which she has been compliant with).  Her daughters and sisters are at bedside and assist with history provision.  She was recently found to have pulmonary nodules and underwent PET scan per   Dottie which daughter describes as normal.  She is due to follow up with him next month for repeat imaging and decision regarding further evaluation and management.  The patient lives alone.  She quit smoking 1 year prior.  Other pertinent medical history as below:    Past Medical History:   Diagnosis Date    Anxiety     Cataract     Emphysema of lung     Polycythemia secondary to hypoxia 8/8/2017       Past Surgical History:   Procedure Laterality Date    EYE SURGERY      HYSTERECTOMY         Review of patient's allergies indicates:   Allergen Reactions    Aspirin Other (See Comments)     Burns stomach, needs to be coated aspirin       Current Facility-Administered Medications on File Prior to Encounter   Medication    [COMPLETED] 0.9%  NaCl infusion    [COMPLETED] albuterol-ipratropium 2.5mg-0.5mg/3mL nebulizer solution 3 mL    azithromycin 500 mg in dextrose 5 % 250 mL IVPB (ready to mix system)    [COMPLETED] cefTRIAXone (ROCEPHIN) 1 g in dextrose 5 % 50 mL IVPB    [COMPLETED] methylPREDNISolone sodium succinate injection 125 mg    [COMPLETED] omnipaque 350 iohexol 100 mL     Current Outpatient Prescriptions on File Prior to Encounter   Medication Sig    albuterol (VENTOLIN HFA) 90 mcg/actuation inhaler Inhale 2 puffs into the lungs every 6 (six) hours as needed for Wheezing. Rescue    amLODIPine (NORVASC) 5 MG tablet TAKE 1 TABLET(5 MG) BY MOUTH EVERY DAY    aspirin (ECOTRIN) 81 MG EC tablet Take 81 mg by mouth once daily.    busPIRone (BUSPAR) 5 MG Tab Take 1 tablet (5 mg total) by mouth 2 (two) times daily as needed.    cyanocobalamin (VITAMIN B-12) 500 MCG tablet Take 500 mcg by mouth once daily.    furosemide (LASIX) 20 MG tablet Take 1 tablet (20 mg total) by mouth once daily.    megestrol (MEGACE) 40 MG Tab TAKE 1 TABLET(40 MG) BY MOUTH EVERY DAY    potassium chloride (MICRO-K) 10 MEQ CpSR TAKE 1 CAPSULE(10 MEQ) BY MOUTH EVERY DAY    umeclidinium-vilanterol (ANORO ELLIPTA) 62.5-25  mcg/actuation DsDv Inhale 1 puff into the lungs once daily. Controller     Family History     Problem Relation (Age of Onset)    Cancer Mother, Father, Brother    Diabetes Brother    Heart disease Mother, Brother    Hyperlipidemia Brother    Hypertension Mother, Sister, Brother        Social History Main Topics    Smoking status: Former Smoker     Packs/day: 0.25     Years: 15.00     Types: Cigarettes     Quit date: 8/8/2016    Smokeless tobacco: Never Used    Alcohol use No    Drug use: No    Sexual activity: No     Review of Systems   Constitutional: Positive for appetite change, diaphoresis and fatigue. Negative for activity change, chills and fever.   HENT: Negative for congestion, postnasal drip, sinus pressure, sore throat and trouble swallowing.    Eyes: Negative for photophobia and visual disturbance.   Respiratory: Positive for shortness of breath and wheezing. Negative for cough and chest tightness.    Cardiovascular: Positive for leg swelling (chronic, bilateral, improved.). Negative for chest pain and palpitations.   Gastrointestinal: Negative for abdominal distention, constipation, diarrhea, nausea and vomiting.   Genitourinary: Negative for dysuria, flank pain, frequency and urgency.   Musculoskeletal: Negative for arthralgias, myalgias and neck pain.   Skin: Negative for color change.   Neurological: Negative for dizziness, weakness, light-headedness and headaches.   Psychiatric/Behavioral: Negative for agitation and confusion. The patient is not nervous/anxious.      Objective:     Vital Signs (Most Recent):  Temp: 97.1 °F (36.2 °C) (12/27/17 2302)  Pulse: (!) 114 (12/27/17 2332)  Resp: (!) 24 (12/27/17 2323)  BP: 105/73 (12/27/17 2332)  SpO2: 100 % (12/27/17 2332) Vital Signs (24h Range):  Temp:  [97.1 °F (36.2 °C)-98.2 °F (36.8 °C)] 97.1 °F (36.2 °C)  Pulse:  [108-137] 114  Resp:  [15-37] 24  SpO2:  [58 %-100 %] 100 %  BP: ()/(69-99) 105/73     Weight: 46.7 kg (103 lb)  Body mass index  is 21.53 kg/m².    Physical Exam   Constitutional: She is oriented to person, place, and time. She appears well-developed and well-nourished. No distress.   HENT:   Head: Normocephalic and atraumatic.   Eyes: Conjunctivae and EOM are normal. Pupils are equal, round, and reactive to light.   Neck: Normal range of motion. Neck supple. No thyromegaly present.   Cardiovascular: Regular rhythm, normal heart sounds and intact distal pulses.  Tachycardia present.    No murmur heard.  Pulmonary/Chest: She has wheezes. She exhibits no tenderness.   Barrel Chest; prolonged expiratory phase.  Decreased breath sounds throughout with rales to RLL   Abdominal: Soft. Bowel sounds are normal. She exhibits no distension. There is no tenderness.   Musculoskeletal: Normal range of motion. She exhibits no edema or tenderness.   Neurological: She is alert and oriented to person, place, and time. No cranial nerve deficit.   Skin: Skin is warm and dry. Capillary refill takes less than 2 seconds. No erythema.   Nail clubbing noted.   Psychiatric: She has a normal mood and affect. Her behavior is normal. Judgment and thought content normal.         CRANIAL NERVES     CN III, IV, VI   Pupils are equal, round, and reactive to light.  Extraocular motions are normal.        Significant Labs:   CBC:   Recent Labs  Lab 12/27/17  1704 12/27/17  1741   WBC 5.80  --    HGB 15.3  --    HCT 47.8 45   *  --      CMP:     Recent Labs  Lab 12/27/17  1711      K 3.1*   CL 98*   CO2 33*   *   BUN 14   CREATININE 0.8   CALCIUM 8.8   PROT 7.6   ALBUMIN 3.2*   BILITOT 0.9   ALKPHOS 59   AST 36   ALT 12*   ANIONGAP 8   EGFRNONAA >60.0     Lactic Acid:     Recent Labs  Lab 12/27/17  1745 12/28/17  0007   LACTATE 3.8* 6.5*   Troponin:   Recent Labs  Lab 12/27/17  1845   TROPONINI 0.01     (Portion of the above labs drawn at and reviewed from prior facility).    Significant Imaging:   CXR: I reviewed images. Increased opacity to RLL concerning  for a pneumonia.  CTA Chest: (Vrad read from prior facility):   -No pulmonary thromboembolism.  -Aneurysmal dilation of the pulmonary trunk and pulmonary arteries that can be seen with severe pulmonary artery hypertension.  -Bilateral pulmonary nodules which could represent scarring or neoplasm.  Correlation with prior imaging recommended.  - Severe emphysematous change and fibrosis.  -Right lower lobe consolidation, likely atelectasis but superimposed pneumonia cannot be excluded.    EKG: I reviewed.   with RIGHT axis deviation.  No ischemic ST changes noted.    Assessment/Plan:     * Acute on chronic respiratory failure with hypoxia and hypercapnia    Patient with severe pulmonary HTN and COPD with concern for acute pulmonary infection-- significant hypoxia despite 4-5 L O2 supplementation at home.  Requiring NIPPV for adequate oxygenation.  Continue bipap, IV steroids.  Scheduled bronchodilators.  Broad spectrum abx for now.  Consult pulmonology for additional recommendations.            Community acquired pneumonia of right lower lobe of lung    With resultant respiratory failure and severe lactic acidosis.  CTA report mentions PNA vs. Atelectasis. Given severity of illness and respiratory failure will cover broadly with Vanc/zosyn/azithromycin.  Check procalcitonin and consider de-escalating abx as appropriate.  Pulmonology consultation pending.          Chronic obstructive pulmonary disease with acute lower respiratory infection    Severe.  IV steroids, scheduled bronchodilator therapy. Consult pulmonology.          Demand ischemia of myocardium    Troponin rising (mildly elevated on arrival here) likely 2/2 hypoxia.  Will trend troponin, continuous cardiac monitoring.  2d ECHO pending-- consider cardiology consultation if clinically warranted.          Malnutrition of moderate degree    Continue megace.  Consult dietician.          Pulmonary nodules/lesions, multiple    Known problem with reportedly  benign PET scan (results unavailable at this facility).  High risk for neoplasm.  Pulmonology consulted (followed outpatient for this problem by Dr. Sinclair).          Pulmonary hypertension due to COPD    Severe.  Continue supplemental O2.  Check 2DEcho.  Additional recommendations as per pulmonology.          Severe sepsis    Fatuma Velez is a 77 y.o. female who presents to the Emergency Department       This patient does have evidence of infective focus  My overall impression is severe sepsis.  Antibiotics given-   Antibiotics     Start     Stop Route Frequency Ordered    12/28/17 2300  azithromycin 500 mg in dextrose 5 % 250 mL IVPB (ready to mix system)      -- IV Every 24 hours (non-standard times) 12/28/17 0031    12/28/17 0230  vancomycin (VANCOCIN) 750 mg in dextrose 5 % 250 mL IVPB      -- IV Every 12 hours (non-standard times) 12/28/17 0116    12/28/17 0200  piperacillin-tazobactam 4.5 g in dextrose 5 % 100 mL IVPB (ready to mix system)      -- IV Every 8 hours (non-standard times) 12/28/17 0052          Severe Sepsis only-  Latest labs reviewed, they are-    Recent Labs  Lab 12/27/17  1711   BILITOT 0.9       Recent Labs  Lab 12/27/17  1745 12/28/17  0007   LACTATE 3.8* 6.5*       Recent Labs  Lab 12/27/17  1741   PH 7.381   PO2 62*   PCO2 50.6*   HCO3 30.0*   BE 5       Organ dysfunction indicated by Lactic acidosis and Acute respiratory failure    Follow up lactate needed- in 4 hours    Septic Shock only-  Shock with decreased perfusion noted, Fluid challenge  was given at 30cc/kg    Post- resuscitation assessment- (Done after fluids given for shock)  Vital signs post fluid administrations were-    Temp Readings from Last 1 Encounters:   12/28/17 97.8 °F (36.6 °C) (Axillary)     BP Readings from Last 1 Encounters:   12/28/17 91/61     Pulse Readings from Last 1 Encounters:   12/28/17 (!) 118       Post bolus perfusion assessment pending.    -Follow blood cultures and procalcitonin.        Right renal  mass    No urology f/u.  Will need to establish with urologist for additional work up and management.          Essential hypertension    Chronic-- BP running low.  Hold antihypertensives and monitor BP closely, resuming medications as clinically appropriate.          I discussed the case with Dr. Abbott ([facility]).  Specifically, discussed BNP elevation and increased lactic acid despite adequate oxygenation.  Dr. Abbott agrees with fluid resuscitation and feels that BNP elevation is likely a product of severe pulmonary HTN.  Will hydrate patient and monitor closely for signs of volume overload.          VTE Risk Mitigation         Ordered     enoxaparin injection 40 mg  Daily     Route:  Subcutaneous        12/28/17 0021     Medium Risk of VTE  Once      12/28/17 0021        I spent 16 minutes of face to face discussion regarding advance directives and end of life planning which included patient and family. Patient/Family understands the seriousness of their condition and would like to make their end of life decisions known as follows- DNR.  Patient and family understand DNR and are all in agreement.        Critical care time spent on the evaluation and treatment of severe organ dysfunction, review of pertinent labs and imaging studies, discussions with consulting providers and discussions with patient/family: 65 minutes.     Lorin Gillespie NP  Department of Hospital Medicine   [facility]

## 2017-12-28 NOTE — PLAN OF CARE
Problem: Patient Care Overview  Goal: Plan of Care Review  Received pt from er per [facility] and switched her to a bipap per md orders. Treatment given with no adverse reactions. Will continue to monitor.

## 2017-12-29 NOTE — PLAN OF CARE
Problem: Patient Care Overview  Goal: Plan of Care Review  Outcome: Ongoing (interventions implemented as appropriate)  Pt on bipap for majority of day. Lasts off of bipap for 10-15 min before desatting to high 70's. 2-3 bouts of vomiting 15 minutes after eating meals. C/O epigastric pain, GI cocktail and carafate given. Clark placed today, only 185 ml of urine output this shift. Dr. Pena aware. Up to chair for 4 hours. Afebrile this shift, safety maintained.

## 2017-12-29 NOTE — NURSING
Given meds with sips of water. Oxygen saturations dropped to 80's with just 2 minutes of being off BIPAP.

## 2017-12-29 NOTE — HOSPITAL COURSE
Patient underwent CTA which was negative for embolism but did show a RLL infiltrate.  She was treated with broad spectrum abx.  She remained with significant lactic acidosis early in her stay despite adequate oxygenation on bipap therapy and fluid resuscitation.  Lactic acid did trend downward with hydration.  A mild troponin elevation on admission had a flat trend. She required constant bipap support for oxygenation.  Overnight (11/28) patient became acutely unresponsive with agonal respirations and rapid decline in respiratory rate and heart rate despite continuous bipap therapy.  She progressed rather rapidly to PEA and subsequently asystole.  She and her family very plainly confirmed a DNR code status on admission, thus no resuscitative efforts were made on her behalf. Her family was notified and arrived after patient pronounced by ED physician.

## 2017-12-29 NOTE — PROCEDURES
MEASUREMENTS:  Aortic root:  3.3.  Left atrium:  3.8.  AV excursion:  1.7.  LVIDD:  2.0?  LVS:  1.4?  IVSD:  1.1  LVPW:  1.1.    The study is somewhat technically difficult.  The left atrium is normal.  The   left ventricle is also normal in size.  There is a D-shaped deformity of   interventricular septum with paradoxical motion compatible with right   ventricular volume and/or pressure overload.  The right atrium and right   ventricle are significantly dilated.  The mitral valve shows annular   calcification.  The aortic valve shows sclerotic changes.  The tricuspid and   pulmonic valves are unremarkable.  There is no significant pericardial effusion.    No intracardiac thrombi or masses or vegetation seen.    Doppler and color flow imaging shows mild pulmonic regurgitation.  There is also   moderate tricuspid regurgitation with estimated PA systolic pressure of 55   mmHg.    CONCLUSION:  1.  Normal left atrium and size.  2.  Normal left ventricular systolic function, estimated ejection fraction 55%.  3.  Dilated right atrium and right ventricle.  4.  Mitral annular calcification.  5.  Aortic valve sclerosis.  6.  Moderate tricuspid regurgitation with moderate pulmonary hypertension.      /CRIS  dd: 12/28/2017 18:13:53 (CST)  td: 12/28/2017 19:20:10 (CST)  Doc ID   #[id number]  Job ID #[id number]    CC: Mona Pena M.D.

## 2017-12-29 NOTE — SIGNIFICANT EVENT
Positioned self sitting straight up with legs on side of bed resting on bedside table with BIPAP on. Verbalized needing to catch her breath and that she couldn't sleep.     0020  slumped over bedside table and respirations decreased. Positioned back in bed and agonal breathing noted with heart rate slowly decreasing. Lorin Gillespie NP notified.    0040 Rudy Jones called of status change and encouraged to come to hospital. Left message on daughter Viri's phone.

## 2017-12-29 NOTE — SIGNIFICANT EVENT
Called by nursing ~ 0030-- notified of patient with agonal respirations, unresponsive, with decreasing HR. Upon arrival to unit HR dropping, no respiratory effort noted with bipap in place.  Pulse confirmed with doppler though slow and decreasing.  Patient unresponsive.  Pulse rate rapidly declined and patient apneic within 4 minutes of arrival to floor with PEA and eventually asystole on monitor.  Patient DNR order placed by me after lengthy discussion with lucid patient and daughters and sister on admit, thus no resuscitative efforts made in accordance with patient's wishes.    I talked to both Desire and Viri (daughters) who state they are on their way to see patient.    I did meet with daughter (Viri) once she arrived on unit.  Provided emotional support.  She states her mother was aware she was dying and told her earlier today she knew she wouldn't be coming home from the hospital.  She expresses gratitude for her mother's care.  Questions answered.  Family to stay and visit at bedside with nursing to proceed with post-mortum care per protocol.

## 2017-12-29 NOTE — DISCHARGE SUMMARY
[facility] Hospital Medicine  Discharge Summary      Patient Name: Fatuma Velez  MRN: [id number]  Admission Date: 12/27/2017  Hospital Length of Stay: 2 days  Discharge Date and Time:  12/29/2017 2:10 AM  Attending Physician: Mona Pena MD   Discharging Provider: Lorin Gillespie NP  Primary Care Provider: Henna Kemp MD      HPI:   Fatuma Velez is a 77-year-old female with a past medical history of COPD on home oxygen, polycythemia, pulmonary HTN, and anxiety.  She was admitted to the service of hospital medicine with acute on chronic respiratory failure.  She was transferred from [facility] for pulmonology services.  She presented to the ED there with acutely worsened SOB that occurred today while getting up to ambulate.  Her family reports prior episode similar to this on [place] day.  She was noted to be wheezing and diaphoretic.  She reports compliance with continuous home O2 therapy (4-5L), as well as routine use of inhalers as prescribed.  She denies any cough, sputum production, or chest pain.  She was found to be Hypoxic on EMS arrival with O2 sats in the 70s.  She required bipap in ED at [facility] and improved with steroids, nebulizer, and NIPPV.  Dr. Portillo accepted the patient for consultation.  She received a dose of rocephin prior to transfer and blood cultures were drawn.  Patient currently reports feeling better on bipap.  Denies any chest pain, but endorses occasional palpitations and recent leg swelling (was started on lasix which she has been compliant with).  Her daughters and sisters are at bedside and assist with history provision.  She was recently found to have pulmonary nodules and underwent PET scan per Dr. Sinclair which daughter describes as normal.  She is due to follow up with him next month for repeat imaging and decision regarding further evaluation and management.  The patient lives alone.  She quit smoking 1 year prior.  Other pertinent  medical history as below:    * No surgery found *      Hospital Course:   Patient underwent CTA which was negative for embolism but did show a RLL infiltrate.  She was treated with broad spectrum abx.  She remained with significant lactic acidosis early in her stay despite adequate oxygenation on bipap therapy and fluid resuscitation.  Lactic acid did trend downward with hydration.  A mild troponin elevation on admission She required constant bipap support for oxygenation.  Overnight (11/28) patient became acutely unresponsive with agonal respirations and rapid decline in respiratory rate and heart rate despite continuous bipap therapy.  She progressed rather rapidly to PEA and subsequently asystole.  She and her family very plainly confirmed a DNR code status on admission, thus no resuscitative efforts were made on her behalf. Her family was notified and arrived after patient pronounced by ED physician.     Consults:   Consults         Status Ordering Provider     Inpatient consult to Pulmonology  Once     Provider:  MD Debbie Gonzalez JESSICA M.     Inpatient consult to Registered Dietitian/Nutritionist  Once     Provider:  (Not yet assigned)    Completed MIA BAIRD     Pharmacy to dose Vancomycin consult  Once     Provider:  (Not yet assigned)    Acknowledged MIA BAIRD          No new Assessment & Plan notes have been filed under this hospital service since the last note was generated.  Service: Hospital Medicine    Final Active Diagnoses:    Diagnosis Date Noted POA    PRINCIPAL PROBLEM:  Acute on chronic respiratory failure with hypoxia and hypercapnia [J96.21, J96.22] 12/27/2017 Yes    Community acquired pneumonia of right lower lobe of lung [J18.1] 12/27/2017 Yes    Chronic obstructive pulmonary disease with acute lower respiratory infection [J44.0] 05/27/2014 Yes     Chronic    Demand ischemia of myocardium [I24.8] 12/28/2017 Yes    Hyperglycemia, drug-induced [R73.9,  T50.905A] 2017 Yes    Severe sepsis [A41.9, R65.20] 2017 Yes    Pulmonary hypertension due to COPD [I27.23, J44.9] 2017 Yes    Pulmonary nodules/lesions, multiple [R91.8] 2017 Yes    Malnutrition of moderate degree [E44.0] 2017 Yes    Right renal mass [N28.89] 05/15/2017 Yes     Chronic    Thrombocytopenia [D69.6] 2014 Yes     Chronic    Essential hypertension [I10] 2014 Yes     Chronic      Problems Resolved During this Admission:    Diagnosis Date Noted Date Resolved POA       Discharged Condition:     Disposition:     Follow Up:    Patient Instructions:   No discharge procedures on file.    Significant Diagnostic Studies: Labs:   CMP   Recent Labs  Lab 17  1711 17  0407    141   K 3.1* 4.0   CL 98* 105   CO2 33* 21*   * 213*   BUN 14 12   CREATININE 0.8 0.8   CALCIUM 8.8 8.3*   PROT 7.6 7.3   ALBUMIN 3.2* 2.7*   BILITOT 0.9 0.6   ALKPHOS 59 64   AST 36 24   ALT 12* 11   ANIONGAP 8 15   ESTGFRAFRICA >60.0 >60   EGFRNONAA >60.0 >60   , CBC   Recent Labs  Lab 17  1704  17  0407   WBC 5.80  --  4.40   HGB 15.3  --  14.4   HCT 47.8  < > 44.9   *  --  119*   < > = values in this interval not displayed., INR No results found for: INR, PROTIME and Troponin   Recent Labs  Lab 17  1143   TROPONINI 0.027*       Pending Diagnostic Studies:     Procedure Component Value Units Date/Time    C-reactive protein [[id number]]     Order Status:  Sent Lab Status:  No result     Specimen:  Blood from Blood     CBC with Automated Differential [[id number]]     Order Status:  Sent Lab Status:  No result     Specimen:  Blood from Blood     Comprehensive Metabolic Panel (CMP) [[id number]]     Order Status:  Sent Lab Status:  No result     Specimen:  Blood from Blood     EKG 12-lead [[id number]]     Order Status:  Sent Lab Status:  No result     Magnesium [[id number]]     Order Status:  Sent Lab Status:  No result     Specimen:  Blood from  Blood     Phosphorus [[id number]]     Order Status:  Sent Lab Status:  No result     Specimen:  Blood from Blood          Medications:  None (patient  at medical facility)    Indwelling Lines/Drains at time of discharge:   Lines/Drains/Airways     Drain                 Urethral Catheter 17 1255 Straight-tip 16 Fr. less than 1 day                Time spent on the discharge of patient: 36 minutes  Patient was seen and examined on the date of discharge and determined to be suitable for discharge.    Critical care time spent on the evaluation and treatment of severe organ dysfunction, review of pertinent labs and imaging studies, discussions with consulting providers and discussions with patient/family: 15 minutes.     Lorin Gillespie NP  Department of Hospital Medicine  [facility]